# Patient Record
Sex: FEMALE | Race: BLACK OR AFRICAN AMERICAN | Employment: OTHER | URBAN - METROPOLITAN AREA
[De-identification: names, ages, dates, MRNs, and addresses within clinical notes are randomized per-mention and may not be internally consistent; named-entity substitution may affect disease eponyms.]

---

## 2017-09-07 ENCOUNTER — HOSPITAL ENCOUNTER (EMERGENCY)
Age: 77
Discharge: LEFT BEFORE DISCHARGE | End: 2017-09-07
Attending: EMERGENCY MEDICINE
Payer: MEDICARE

## 2017-09-07 ENCOUNTER — APPOINTMENT (OUTPATIENT)
Dept: GENERAL RADIOLOGY | Age: 77
End: 2017-09-07
Attending: EMERGENCY MEDICINE
Payer: MEDICARE

## 2017-09-07 VITALS
HEART RATE: 77 BPM | WEIGHT: 110 LBS | SYSTOLIC BLOOD PRESSURE: 178 MMHG | DIASTOLIC BLOOD PRESSURE: 88 MMHG | BODY MASS INDEX: 19.8 KG/M2 | TEMPERATURE: 97.4 F | OXYGEN SATURATION: 98 % | RESPIRATION RATE: 16 BRPM

## 2017-09-07 DIAGNOSIS — R07.9 CHEST PAIN, UNSPECIFIED TYPE: Primary | ICD-10-CM

## 2017-09-07 PROCEDURE — 71010 XR CHEST SNGL V: CPT

## 2017-09-07 PROCEDURE — 99284 EMERGENCY DEPT VISIT MOD MDM: CPT

## 2017-09-07 RX ORDER — GUAIFENESIN 100 MG/5ML
162 LIQUID (ML) ORAL
Status: DISCONTINUED | OUTPATIENT
Start: 2017-09-07 | End: 2017-09-07 | Stop reason: HOSPADM

## 2017-09-07 RX ORDER — ACETAMINOPHEN 500 MG
1000 TABLET ORAL ONCE
Status: DISCONTINUED | OUTPATIENT
Start: 2017-09-07 | End: 2017-09-07 | Stop reason: HOSPADM

## 2017-09-07 NOTE — ED TRIAGE NOTES
Pt family noticed patient had not changed her clothes in one week. Family wanted patient to change her clothes. Pt refused, and states that she \"is going to the hospital.\" Family states she does this when she doesn't want to do what is requested of her. 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

## 2017-09-07 NOTE — DISCHARGE INSTRUCTIONS
AS WE DISCUSSED, WE BELIEVE YOU SHOULD HAVE MORE TESTS DONE IN THE ER. IF YOU HAVE ANY WORSENING SYMPTOMS, OR IF YOU CHANGES YOUR MIND AND WOULD LIKE TO HAVE THE TESTS DONE, THEN RETURN TO THE ER RIGHT AWAY. OTHERWISE, PLEASE SEE YOUR PRIMARY CARE DOCTOR AS SOON AS POSSIBLE. Chest Pain: Care Instructions  Your Care Instructions  There are many things that can cause chest pain. Some are not serious and will get better on their own in a few days. But some kinds of chest pain need more testing and treatment. Your doctor may have recommended a follow-up visit in the next 8 to 12 hours. If you are not getting better, you may need more tests or treatment. Even though your doctor has released you, you still need to watch for any problems. The doctor carefully checked you, but sometimes problems can develop later. If you have new symptoms or if your symptoms do not get better, get medical care right away. If you have worse or different chest pain or pressure that lasts more than 5 minutes or you passed out (lost consciousness), call 911 or seek other emergency help right away. A medical visit is only one step in your treatment. Even if you feel better, you still need to do what your doctor recommends, such as going to all suggested follow-up appointments and taking medicines exactly as directed. This will help you recover and help prevent future problems. How can you care for yourself at home? · Rest until you feel better. · Take your medicine exactly as prescribed. Call your doctor if you think you are having a problem with your medicine. · Do not drive after taking a prescription pain medicine. When should you call for help? Call 911 if:  · You passed out (lost consciousness). · You have severe difficulty breathing. · You have symptoms of a heart attack. These may include:  ¨ Chest pain or pressure, or a strange feeling in your chest.  ¨ Sweating. ¨ Shortness of breath. ¨ Nausea or vomiting.   ¨ Pain, pressure, or a strange feeling in your back, neck, jaw, or upper belly or in one or both shoulders or arms. ¨ Lightheadedness or sudden weakness. ¨ A fast or irregular heartbeat. After you call 911, the  may tell you to chew 1 adult-strength or 2 to 4 low-dose aspirin. Wait for an ambulance. Do not try to drive yourself. Call your doctor today if:  · You have any trouble breathing. · Your chest pain gets worse. · You are dizzy or lightheaded, or you feel like you may faint. · You are not getting better as expected. · You are having new or different chest pain. Where can you learn more? Go to http://tomasz-joycelyn.info/. Enter A120 in the search box to learn more about \"Chest Pain: Care Instructions. \"  Current as of: March 20, 2017  Content Version: 11.3  © 0170-5012 [employer] Care instructions adapted under license by [employer] (which disclaims liability or warranty for this information). If you have questions about a medical condition or this instruction, always ask your healthcare professional. [place] [address] any warranty or liability for your use of this information.

## 2017-09-07 NOTE — ED NOTES
Dr. Erin Sung at bedside. The patient continues to refuse all treatments and is requesting to go home. AMA form completed. The patient's caregiver at bedside.

## 2017-09-07 NOTE — ED PROVIDER NOTES
HPI Comments: 3:21 PM Barbi Orozco is a 78 y.o. Female with a h/o CVA and HTN presenting to the ED via EMS with complaints of chest pain that occurred this morning when she woke up. Notes similar sx in the past, unsure of cause. The patient denies SOB or fever. No other complaints at this time. PCP: Benny Gamble NP      The history is provided by the patient. Past Medical History:   Diagnosis Date    Hypertension     Stroke [facility])        No past surgical history on file. Family History:   Problem Relation Age of Onset    Diabetes Neg Hx     Heart Disease Neg Hx        Social History     Social History    Marital status:      Spouse name: N/A    Number of children: N/A    Years of education: N/A     Occupational History    Not on file. Social History Main Topics    Smoking status: Never Smoker    Smokeless tobacco: Not on file    Alcohol use No    Drug use: Not on file    Sexual activity: Not on file     Other Topics Concern    Not on file     Social History Narrative         ALLERGIES: Review of patient's allergies indicates no known allergies. Review of Systems   Constitutional: Negative for chills, fatigue and fever. HENT: Negative for congestion, ear pain and sore throat. Eyes: Negative for pain, redness and itching. Respiratory: Negative for chest tightness, shortness of breath and wheezing. Cardiovascular: Positive for chest pain. Negative for palpitations and leg swelling. Gastrointestinal: Negative for abdominal pain, diarrhea, nausea and vomiting. Genitourinary: Negative for dysuria, flank pain, hematuria and pelvic pain. Musculoskeletal: Negative for arthralgias, back pain, joint swelling and myalgias. Skin: Negative for color change, pallor and rash. Neurological: Negative for dizziness, weakness and headaches. Hematological: Negative for adenopathy. Does not bruise/bleed easily.        Vitals:    09/07/17 1501   BP: 178/88 Pulse: 77   Resp: 16   Temp: 97.4 °F (36.3 °C)   SpO2: 98%   Weight: 49.9 kg (110 lb)            Physical Exam   Constitutional: No distress. HENT:   Head: Normocephalic and atraumatic. Mouth/Throat: Oropharynx is clear and moist.   Eyes: Conjunctivae and EOM are normal. Pupils are equal, round, and reactive to light. Neck: Normal range of motion. Neck supple. Cardiovascular: Normal rate, regular rhythm and normal heart sounds. No murmur heard. Pulmonary/Chest: Effort normal and breath sounds normal. She has no wheezes. She has no rales. Abdominal: Soft. Bowel sounds are normal. She exhibits no distension. There is no tenderness. Musculoskeletal: Normal range of motion. She exhibits no edema or deformity. Lymphadenopathy:     She has no cervical adenopathy. Neurological: She is alert. Coordination and gait normal.   Right sided hemiplegia, baseline per patient   Skin: Skin is warm and dry. No rash noted. She is not diaphoretic. No erythema. Psychiatric: She has a normal mood and affect. Her behavior is normal.        MDM  Number of Diagnoses or Management Options    ED Course       Procedures             Vitals:  Patient Vitals for the past 12 hrs:   Temp Pulse Resp BP SpO2   09/07/17 1501 97.4 °F (36.3 °C) 77 16 178/88 98 %       Medications Ordered:  Medications   aspirin chewable tablet 162 mg (not administered)   acetaminophen (TYLENOL) tablet 1,000 mg (not administered)       Lab Findings:  No results found for this or any previous visit (from the past 12 hour(s)). X-ray, CT or radiology findings or impressions:  XR CHEST SNGL V    (Results Pending)       Progress notes, consult notes, or additional procedure notes:    3:48 PM  Patient refusing to have EKG or any further testing. States she feels fine and wants to go home. She is able to articulate to me that risks of incomplete chest pain work up include heart attack, worsening pain or condition, permanent disability, or death. Caretaker at bedside, states her behavior is at baseline. Patient encouraged to f/u with primary care. Instructed that if she feels worse in any way or changes her mind then she should return to the ER immediately. 4:05 PM  Discussed with patient's condition with her son Buck Vizcarra who is reportedly medical POA. States that his mom only came in because she was upset with the caretaker wanting her to get dressed and go outside, so she called 911. He is okay with plan for releasing patient with option to f/u with primary care or return to ER. Diagnosis:   1. Chest pain, unspecified type        Disposition: Discharge AMA    Follow-up Information     None           Patient's Medications   Start Taking    No medications on file   Continue Taking    AMLODIPINE (NORVASC) 10 MG TABLET    Take 10 mg by mouth every evening. Indications: HYPERTENSION    ATORVASTATIN (LIPITOR) 40 MG TABLET    Take 1 Tab by mouth nightly. CALCIUM CARBONATE (CALTREX) 600 MG (1,500 MG) TABLET    Take 600 mg by mouth two (2) times a day. HYDRALAZINE (APRESOLINE) 25 MG TABLET    1 tab by mouth in morning, 2 tabs by mouth before bedtime  Indications: HYPERTENSION    LISINOPRIL (PRINIVIL, ZESTRIL) 10 MG TABLET    Take 40 mg by mouth daily. These Medications have changed    No medications on file   Stop Taking    No medications on file       Gabe Soto acting as a scribe for and in the presence of Krystal Guy MD      September 07, 2017 at [facility] PM       Provider Attestation:      I personally performed the services described in the documentation, reviewed the documentation, as recorded by the scribe in my presence, and it accurately and completely records my words and actions.  September 07, 2017 at 3:24 PM - Krystal Guy MD

## 2018-06-06 ENCOUNTER — HOSPITAL ENCOUNTER (EMERGENCY)
Age: 78
Discharge: HOME | End: 2018-06-06
Attending: EMERGENCY MEDICINE | Admitting: EMERGENCY MEDICINE
Payer: MEDICARE

## 2018-06-06 VITALS
RESPIRATION RATE: 21 BRPM | DIASTOLIC BLOOD PRESSURE: 106 MMHG | HEART RATE: 67 BPM | OXYGEN SATURATION: 100 % | TEMPERATURE: 98 F | SYSTOLIC BLOOD PRESSURE: 168 MMHG | WEIGHT: 103 LBS | BODY MASS INDEX: 18.54 KG/M2

## 2018-06-06 DIAGNOSIS — R42 DIZZINESS: Primary | ICD-10-CM

## 2018-06-06 LAB
ANION GAP SERPL CALC-SCNC: 5 MMOL/L (ref 3–18)
BASOPHILS # BLD: 0 K/UL (ref 0–0.06)
BASOPHILS NFR BLD: 0 % (ref 0–3)
BUN SERPL-MCNC: 10 MG/DL (ref 7–18)
BUN/CREAT SERPL: 14 (ref 12–20)
CALCIUM SERPL-MCNC: 8.8 MG/DL (ref 8.5–10.1)
CHLORIDE SERPL-SCNC: 105 MMOL/L (ref 100–108)
CO2 SERPL-SCNC: 33 MMOL/L (ref 21–32)
CREAT SERPL-MCNC: 0.72 MG/DL (ref 0.6–1.3)
DIFFERENTIAL METHOD BLD: ABNORMAL
EOSINOPHIL # BLD: 0.1 K/UL (ref 0–0.4)
EOSINOPHIL NFR BLD: 1 % (ref 0–5)
ERYTHROCYTE [DISTWIDTH] IN BLOOD BY AUTOMATED COUNT: 15.1 % (ref 11.6–14.5)
GLUCOSE SERPL-MCNC: 83 MG/DL (ref 74–99)
HCT VFR BLD AUTO: 43.7 % (ref 35–45)
HGB BLD-MCNC: 14.1 G/DL (ref 12–16)
LYMPHOCYTES # BLD: 2 K/UL (ref 0.8–3.5)
LYMPHOCYTES NFR BLD: 35 % (ref 20–51)
MAGNESIUM SERPL-MCNC: 2.3 MG/DL (ref 1.6–2.6)
MCH RBC QN AUTO: 25.3 PG (ref 24–34)
MCHC RBC AUTO-ENTMCNC: 32.3 G/DL (ref 31–37)
MCV RBC AUTO: 78.5 FL (ref 74–97)
MONOCYTES # BLD: 0.6 K/UL (ref 0–1)
MONOCYTES NFR BLD: 11 % (ref 2–9)
NEUTS SEG # BLD: 2.9 K/UL (ref 1.8–8)
NEUTS SEG NFR BLD: 53 % (ref 42–75)
PLATELET # BLD AUTO: 155 K/UL (ref 135–420)
PLATELET COMMENTS,PCOM: ABNORMAL
POTASSIUM SERPL-SCNC: 3.4 MMOL/L (ref 3.5–5.5)
RBC # BLD AUTO: 5.57 M/UL (ref 4.2–5.3)
RBC MORPH BLD: ABNORMAL
SODIUM SERPL-SCNC: 143 MMOL/L (ref 136–145)
WBC # BLD AUTO: 5.6 K/UL (ref 4.6–13.2)

## 2018-06-06 PROCEDURE — 83735 ASSAY OF MAGNESIUM: CPT | Performed by: EMERGENCY MEDICINE

## 2018-06-06 PROCEDURE — 99285 EMERGENCY DEPT VISIT HI MDM: CPT

## 2018-06-06 PROCEDURE — 93005 ELECTROCARDIOGRAM TRACING: CPT

## 2018-06-06 PROCEDURE — 85025 COMPLETE CBC W/AUTO DIFF WBC: CPT | Performed by: EMERGENCY MEDICINE

## 2018-06-06 PROCEDURE — 80048 BASIC METABOLIC PNL TOTAL CA: CPT | Performed by: EMERGENCY MEDICINE

## 2018-06-06 NOTE — ED TRIAGE NOTES
Episode of dizzyness today, resolved on arrival. Hx stroke, amb with cane. Right deficit.  Limited verbal

## 2018-06-06 NOTE — ED PROVIDER NOTES
EMERGENCY DEPARTMENT HISTORY AND PHYSICAL EXAM    7:35 PM      Date: 6/6/2018  Patient Name: Dolores Wheatley    History of Presenting Illness     Chief Complaint   Patient presents with    Dizziness         History Provided By: Patient, Patient's son    Chief Complaint: Dizziness  Duration:  Minutes  Timing:  Not currently present  Location:   Quality: N/A  Severity: N/A  Modifying Factors: None  Associated Symptoms: elevated blood pressure      Additional History (Context): Dolores Wheatley is a 70 y.o. female with a hx of HTN and stroke who presents for evaluation of dizziness that was reported by the [address] staff PTA. The nursing home also reported systolic blood pressure of over 200. The pt reports that she was never dizzy and does not know why she was sent here. She has no complaints. 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 She is at baseline per son. She has not been on BP medications in over a year and a half, she is followed by [facility]. She denies chest pain, SOB, abdominal pain, shoulder pain, and any further complaints. PCP: Cinthia Weiss NP    Current Outpatient Prescriptions   Medication Sig Dispense Refill    atorvastatin (LIPITOR) 40 mg tablet Take 1 Tab by mouth nightly. 30 Tab 0    hydrALAZINE (APRESOLINE) 25 mg tablet 1 tab by mouth in morning, 2 tabs by mouth before bedtime  Indications: HYPERTENSION 90 Tab 0    calcium carbonate (CALTREX) 600 mg (1,500 mg) tablet Take 600 mg by mouth two (2) times a day.  amLODIPine (NORVASC) 10 mg tablet Take 10 mg by mouth every evening. Indications: HYPERTENSION      lisinopril (PRINIVIL, ZESTRIL) 10 mg tablet Take 40 mg by mouth daily. Past History     Past Medical History:  Past Medical History:   Diagnosis Date    Hypertension     Stroke [facility])        Past Surgical History:  No past surgical history on file.     Family History:  Family History   Problem Relation Age of Onset    Diabetes Neg Hx     Heart Disease Neg Hx        Social History:  Social History   Substance Use Topics    Smoking status: Never Smoker    Smokeless tobacco: Not on file    Alcohol use No       Allergies:  No Known Allergies      Review of Systems     Review of Systems   Constitutional: Negative. Negative for chills and fever. HENT: Negative. Negative for congestion. Eyes: Negative. Negative for visual disturbance. Respiratory: Negative. Negative for cough and shortness of breath. Cardiovascular: Negative. Negative for chest pain and leg swelling. Gastrointestinal: Negative. Negative for abdominal pain, diarrhea and vomiting. Genitourinary: Negative. Negative for difficulty urinating, dysuria and vaginal discharge. Musculoskeletal: Negative. Negative for back pain and myalgias. Skin: Negative. Negative for rash and wound. Neurological: Positive for dizziness. Negative for weakness and light-headedness. Psychiatric/Behavioral: Negative. Negative for suicidal ideas. All other systems reviewed and are negative. Physical Exam     Visit Vitals    /87    Pulse 80    Temp 98 °F (36.7 °C)    Resp 16    Wt 46.7 kg (103 lb)    SpO2 98%    BMI 18.54 kg/m2       Physical Exam   Constitutional: She is oriented to person, place, and time. She appears well-developed and well-nourished. No distress. HENT:   Head: Normocephalic and atraumatic. Mouth/Throat: Oropharynx is clear and moist.   Eyes: Conjunctivae and EOM are normal. Pupils are equal, round, and reactive to light. No scleral icterus. Neck: Trachea normal and normal range of motion. Neck supple. No JVD present. No thyromegaly present. Cardiovascular: Normal rate, regular rhythm, S1 normal and S2 normal.  Exam reveals no gallop and no friction rub. No murmur heard.   Pulmonary/Chest: Effort normal and breath sounds normal. No accessory muscle usage. No respiratory distress. Abdominal: Soft. Normal appearance. She exhibits no distension. There is no tenderness. There is no rigidity, no rebound and no guarding. Musculoskeletal: Normal range of motion. She exhibits no edema or tenderness. Neurological: She is alert and oriented to person, place, and time. No sensory deficit. Coordination normal.   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 No rash noted. Psychiatric: She has a normal mood and affect. Her speech is normal and behavior is normal.   Vitals reviewed. Diagnostic Study Results     Labs -  Recent Results (from the past 12 hour(s))   EKG, 12 LEAD, INITIAL    Collection Time: 06/06/18  6:35 PM   Result Value Ref Range    Ventricular Rate 72 BPM    Atrial Rate 72 BPM    P-R Interval 114 ms    QRS Duration 76 ms    Q-T Interval 408 ms    QTC Calculation (Bezet) 446 ms    Calculated P Axis 49 degrees    Calculated R Axis 37 degrees    Calculated T Axis 75 degrees    Diagnosis       Normal sinus rhythm  Normal ECG  When compared with ECG of 22-DEC-2016 22:31,  T wave inversion no longer evident in Lateral leads     CBC WITH AUTOMATED DIFF    Collection Time: 06/06/18  7:00 PM   Result Value Ref Range    WBC 5.6 4.6 - 13.2 K/uL    RBC 5.57 (H) 4.20 - 5.30 M/uL    HGB 14.1 12.0 - 16.0 g/dL    HCT 43.7 35.0 - 45.0 %    MCV 78.5 74.0 - 97.0 FL    MCH 25.3 24.0 - 34.0 PG    MCHC 32.3 31.0 - 37.0 g/dL    RDW 15.1 (H) 11.6 - 14.5 %    PLATELET 230 523 - 164 K/uL    NEUTROPHILS 53 42 - 75 %    LYMPHOCYTES 35 20 - 51 %    MONOCYTES 11 (H) 2 - 9 %    EOSINOPHILS 1 0 - 5 %    BASOPHILS 0 0 - 3 %    ABS. NEUTROPHILS 2.9 1.8 - 8.0 K/UL    ABS. LYMPHOCYTES 2.0 0.8 - 3.5 K/UL    ABS. MONOCYTES 0.6 0 - 1.0 K/UL    ABS. EOSINOPHILS 0.1 0.0 - 0.4 K/UL    ABS.  BASOPHILS 0.0 0.0 - 0.06 K/UL    DF MANUAL      PLATELET COMMENTS ADEQUATE PLATELETS      RBC COMMENTS ANISOCYTOSIS  1+           Radiologic Studies -   No orders to display         Medical Decision Making   I am the first provider for this patient. I reviewed the vital signs, available nursing notes, past medical history, past surgical history, family history and social history. Vital Signs-Reviewed the patient's vital signs. Pulse Oximetry Analysis -  98% on room air (Interpretation)WNL    Cardiac Monitor:  Rate: 80 BPM  Rhythm:  Normal Sinus Rhythm     EKG: Interpreted by the EP. Time Interpreted: 1835   Rate: 72 BPM   Rhythm: Normal Sinus Rhythm    Interpretation:No evidence of WPW, HOCM, Brugada, prolonged QTc, or acute ischemia on ekg. Comparison:     Records Reviewed: Nursing Notes and Old Medical Records (Time of Review: 7:35 PM)    ED Course: Progress Notes, Reevaluation, and Consults:  Consult:  Discussed care with Dr. Kayode John, On-call physician for [facility]  Standard discussion; including history of patients chief complaint, available diagnostic results, and treatment course. He agrees with basic screening labs and if the pt is stable and has no signs of stroke it is perfectly reasonable to go back to her facility and they will contact her tomorrow for close outpatient follow up.  8:09 PM, 06/06/18     9:09 PM Pt reevaluated. Discussed results and plan for discharge with PCP follow up with pt and son. Pt still feels good and still appears well. Discussed return precautions for worsening sx. Provider Notes (Medical Decision Making): Ana Bartholomew is a 70 y.o. female coming in with reported dizziness by [facility] staff. Patient is completely A+Ox4 and denies these complaints at any time. She states that she feels fine and would like to be discharged home. Only focal deficits are old and no evidence of acute stroke or other organic pathologic process. Discussed with PCP and they are in agreement with plan for discharge and will follow up with patient in the next 1-2 days. Diagnosis     Clinical Impression:   1. Dizziness        Disposition: Discharge    Follow-up Information     Follow up With Details Comments [address], NP Call in 1 day  [address] [id number] [phone]      [facility] EMERGENCY DEPT  As needed, If symptoms worsen [address]  [phone]           Patient's Medications   Start Taking    No medications on file   Continue Taking    AMLODIPINE (NORVASC) 10 MG TABLET    Take 10 mg by mouth every evening. Indications: HYPERTENSION    ATORVASTATIN (LIPITOR) 40 MG TABLET    Take 1 Tab by mouth nightly. CALCIUM CARBONATE (CALTREX) 600 MG (1,500 MG) TABLET    Take 600 mg by mouth two (2) times a day. HYDRALAZINE (APRESOLINE) 25 MG TABLET    1 tab by mouth in morning, 2 tabs by mouth before bedtime  Indications: HYPERTENSION    LISINOPRIL (PRINIVIL, ZESTRIL) 10 MG TABLET    Take 40 mg by mouth daily. These Medications have changed    No medications on file   Stop Taking    No medications on file     _______________________________    Attestations:  [facility] Sukhdeep acting as a scribe for and in the presence of Salena Block MD      June 06, 2018 at 9:12 PM       Provider Attestation:      I personally performed the services described in the documentation, reviewed the documentation, as recorded by the scribe in my presence, and it accurately and completely records my words and actions.  June 06, 2018 at 9:12 PM - Salena Block MD    _______________________________

## 2018-06-06 NOTE — ED NOTES
Assumed care of pt. Pt stating \"I don't know why I'm here. I feel fine. \" per EMS pt experienced a syncopal edisode while ambulating at [facility]. Pt A&Ox3, denies any other complaints. Will continue to monitor.

## 2018-06-07 LAB
ATRIAL RATE: 72 BPM
CALCULATED P AXIS, ECG09: 49 DEGREES
CALCULATED R AXIS, ECG10: 37 DEGREES
CALCULATED T AXIS, ECG11: 75 DEGREES
DIAGNOSIS, 93000: NORMAL
P-R INTERVAL, ECG05: 114 MS
Q-T INTERVAL, ECG07: 408 MS
QRS DURATION, ECG06: 76 MS
QTC CALCULATION (BEZET), ECG08: 446 MS
VENTRICULAR RATE, ECG03: 72 BPM

## 2018-06-07 NOTE — ED NOTES
Pt up to bathroom and back to bed. Discharge and f/u instructions given to pt and son. Verbalized understanding. Pt refused wheelchair. Ambulated out of ED with son.

## 2018-06-07 NOTE — DISCHARGE INSTRUCTIONS
Dizziness: Care Instructions  Your Care Instructions  Dizziness is the feeling of unsteadiness or fuzziness in your head. It is different than having vertigo, which is a feeling that the room is spinning or that you are moving or falling. It is also different from lightheadedness, which is the feeling that you are about to faint. It can be hard to know what causes dizziness. Some people feel dizzy when they have migraine headaches. Sometimes bouts of flu can make you feel dizzy. Some medical conditions, such as heart problems or high blood pressure, can make you feel dizzy. Many medicines can cause dizziness, including medicines for high blood pressure, pain, or anxiety. If a medicine causes your symptoms, your doctor may recommend that you stop or change the medicine. If it is a problem with your heart, you may need medicine to help your heart work better. If there is no clear reason for your symptoms, your doctor may suggest watching and waiting for a while to see if the dizziness goes away on its own. Follow-up care is a key part of your treatment and safety. Be sure to make and go to all appointments, and call your doctor if you are having problems. It's also a good idea to know your test results and keep a list of the medicines you take. How can you care for yourself at home? · If your doctor recommends or prescribes medicine, take it exactly as directed. Call your doctor if you think you are having a problem with your medicine. · Do not drive while you feel dizzy. · Try to prevent falls. Steps you can take include:  ¨ Using nonskid mats, adding grab bars near the tub, and using night-lights. ¨ Clearing your home so that walkways are free of anything you might trip on. ¨ Letting family and friends know that you have been feeling dizzy. This will help them know how to help you. When should you call for help? Call 911 anytime you think you may need emergency care.  For example, call if:  ? · You passed out (lost consciousness). ? · You have dizziness along with symptoms of a heart attack. These may include:  ¨ Chest pain or pressure, or a strange feeling in the chest.  ¨ Sweating. ¨ Shortness of breath. ¨ Nausea or vomiting. ¨ Pain, pressure, or a strange feeling in the back, neck, jaw, or upper belly or in one or both shoulders or arms. ¨ Lightheadedness or sudden weakness. ¨ A fast or irregular heartbeat. ? · You have symptoms of a stroke. These may include:  ¨ Sudden numbness, tingling, weakness, or loss of movement in your face, arm, or leg, especially on only one side of your body. ¨ Sudden vision changes. ¨ Sudden trouble speaking. ¨ Sudden confusion or trouble understanding simple statements. ¨ Sudden problems with walking or balance. ¨ A sudden, severe headache that is different from past headaches. ?Call your doctor now or seek immediate medical care if:  ? · You feel dizzy and have a fever, headache, or ringing in your ears. ? · You have new or increased nausea and vomiting. ? · Your dizziness does not go away or comes back. ? Watch closely for changes in your health, and be sure to contact your doctor if:  ? · You do not get better as expected. Where can you learn more? Go to http://tomasz-joycelyn.info/. Enter K602 in the search box to learn more about \"Dizziness: Care Instructions. \"  Current as of: March 20, 2017  Content Version: 11.4  © 7653-6971 [employer]. Care instructions adapted under license by [employer] (which disclaims liability or warranty for this information). If you have questions about a medical condition or this instruction, always ask your healthcare professional. [place] [address] any warranty or liability for your use of this information.

## 2018-07-13 ENCOUNTER — HOSPITAL ENCOUNTER (INPATIENT)
Age: 78
LOS: 3 days | Discharge: HOME HEALTH | DRG: 069 | End: 2018-07-16
Attending: EMERGENCY MEDICINE | Admitting: INTERNAL MEDICINE
Payer: MEDICARE

## 2018-07-13 ENCOUNTER — APPOINTMENT (OUTPATIENT)
Dept: MRI IMAGING | Age: 78
DRG: 069 | End: 2018-07-13
Attending: INTERNAL MEDICINE
Payer: MEDICARE

## 2018-07-13 ENCOUNTER — APPOINTMENT (OUTPATIENT)
Dept: GENERAL RADIOLOGY | Age: 78
DRG: 069 | End: 2018-07-13
Attending: INTERNAL MEDICINE
Payer: MEDICARE

## 2018-07-13 ENCOUNTER — APPOINTMENT (OUTPATIENT)
Dept: CT IMAGING | Age: 78
DRG: 069 | End: 2018-07-13
Attending: STUDENT IN AN ORGANIZED HEALTH CARE EDUCATION/TRAINING PROGRAM
Payer: MEDICARE

## 2018-07-13 ENCOUNTER — APPOINTMENT (OUTPATIENT)
Dept: GENERAL RADIOLOGY | Age: 78
DRG: 069 | End: 2018-07-13
Attending: STUDENT IN AN ORGANIZED HEALTH CARE EDUCATION/TRAINING PROGRAM
Payer: MEDICARE

## 2018-07-13 DIAGNOSIS — G45.9 TRANSIENT CEREBRAL ISCHEMIA, UNSPECIFIED TYPE: Primary | ICD-10-CM

## 2018-07-13 DIAGNOSIS — I15.9 SECONDARY HYPERTENSION: Chronic | ICD-10-CM

## 2018-07-13 LAB
ANION GAP SERPL CALC-SCNC: 9 MMOL/L (ref 3–18)
ATRIAL RATE: 69 BPM
BASOPHILS # BLD: 0 K/UL (ref 0–0.1)
BASOPHILS NFR BLD: 0 % (ref 0–2)
BUN SERPL-MCNC: 14 MG/DL (ref 7–18)
BUN/CREAT SERPL: 19 (ref 12–20)
CALCIUM SERPL-MCNC: 9.4 MG/DL (ref 8.5–10.1)
CALCULATED P AXIS, ECG09: 74 DEGREES
CALCULATED R AXIS, ECG10: 15 DEGREES
CALCULATED T AXIS, ECG11: 71 DEGREES
CHLORIDE SERPL-SCNC: 105 MMOL/L (ref 100–108)
CO2 SERPL-SCNC: 28 MMOL/L (ref 21–32)
CREAT SERPL-MCNC: 0.73 MG/DL (ref 0.6–1.3)
DIAGNOSIS, 93000: NORMAL
DIFFERENTIAL METHOD BLD: ABNORMAL
EOSINOPHIL # BLD: 0.2 K/UL (ref 0–0.4)
EOSINOPHIL NFR BLD: 3 % (ref 0–5)
ERYTHROCYTE [DISTWIDTH] IN BLOOD BY AUTOMATED COUNT: 15.2 % (ref 11.6–14.5)
GLUCOSE BLD STRIP.AUTO-MCNC: 66 MG/DL (ref 70–110)
GLUCOSE BLD STRIP.AUTO-MCNC: 89 MG/DL (ref 70–110)
GLUCOSE BLD STRIP.AUTO-MCNC: 99 MG/DL (ref 70–110)
GLUCOSE SERPL-MCNC: 82 MG/DL (ref 74–99)
HCT VFR BLD AUTO: 38.7 % (ref 35–45)
HGB BLD-MCNC: 12.4 G/DL (ref 12–16)
INR PPP: 1 (ref 0.8–1.2)
LYMPHOCYTES # BLD: 2.4 K/UL (ref 0.9–3.6)
LYMPHOCYTES NFR BLD: 41 % (ref 21–52)
MCH RBC QN AUTO: 25.2 PG (ref 24–34)
MCHC RBC AUTO-ENTMCNC: 32 G/DL (ref 31–37)
MCV RBC AUTO: 78.5 FL (ref 74–97)
MONOCYTES # BLD: 0.7 K/UL (ref 0.05–1.2)
MONOCYTES NFR BLD: 12 % (ref 3–10)
NEUTS SEG # BLD: 2.6 K/UL (ref 1.8–8)
NEUTS SEG NFR BLD: 44 % (ref 40–73)
P-R INTERVAL, ECG05: 152 MS
PLATELET # BLD AUTO: 173 K/UL (ref 135–420)
PMV BLD AUTO: 11.6 FL (ref 9.2–11.8)
POTASSIUM SERPL-SCNC: 4.6 MMOL/L (ref 3.5–5.5)
PROTHROMBIN TIME: 12.4 SEC (ref 11.5–15.2)
Q-T INTERVAL, ECG07: 432 MS
QRS DURATION, ECG06: 90 MS
QTC CALCULATION (BEZET), ECG08: 462 MS
RBC # BLD AUTO: 4.93 M/UL (ref 4.2–5.3)
SODIUM SERPL-SCNC: 142 MMOL/L (ref 136–145)
VENTRICULAR RATE, ECG03: 69 BPM
WBC # BLD AUTO: 5.8 K/UL (ref 4.6–13.2)

## 2018-07-13 PROCEDURE — 80048 BASIC METABOLIC PNL TOTAL CA: CPT

## 2018-07-13 PROCEDURE — 96376 TX/PRO/DX INJ SAME DRUG ADON: CPT

## 2018-07-13 PROCEDURE — 82962 GLUCOSE BLOOD TEST: CPT

## 2018-07-13 PROCEDURE — 99285 EMERGENCY DEPT VISIT HI MDM: CPT

## 2018-07-13 PROCEDURE — 93005 ELECTROCARDIOGRAM TRACING: CPT

## 2018-07-13 PROCEDURE — 74011250636 HC RX REV CODE- 250/636: Performed by: STUDENT IN AN ORGANIZED HEALTH CARE EDUCATION/TRAINING PROGRAM

## 2018-07-13 PROCEDURE — 70030 X-RAY EYE FOR FOREIGN BODY: CPT

## 2018-07-13 PROCEDURE — 74011250636 HC RX REV CODE- 250/636: Performed by: INTERNAL MEDICINE

## 2018-07-13 PROCEDURE — A9577 INJ MULTIHANCE: HCPCS | Performed by: INTERNAL MEDICINE

## 2018-07-13 PROCEDURE — 74011250637 HC RX REV CODE- 250/637: Performed by: STUDENT IN AN ORGANIZED HEALTH CARE EDUCATION/TRAINING PROGRAM

## 2018-07-13 PROCEDURE — 74018 RADEX ABDOMEN 1 VIEW: CPT

## 2018-07-13 PROCEDURE — 85610 PROTHROMBIN TIME: CPT

## 2018-07-13 PROCEDURE — 70450 CT HEAD/BRAIN W/O DYE: CPT

## 2018-07-13 PROCEDURE — 74011250637 HC RX REV CODE- 250/637: Performed by: INTERNAL MEDICINE

## 2018-07-13 PROCEDURE — 65660000000 HC RM CCU STEPDOWN

## 2018-07-13 PROCEDURE — 77030021566 MRA NECK W WO CONT

## 2018-07-13 PROCEDURE — 85025 COMPLETE CBC W/AUTO DIFF WBC: CPT

## 2018-07-13 PROCEDURE — 70551 MRI BRAIN STEM W/O DYE: CPT

## 2018-07-13 PROCEDURE — 71045 X-RAY EXAM CHEST 1 VIEW: CPT

## 2018-07-13 PROCEDURE — 96374 THER/PROPH/DIAG INJ IV PUSH: CPT

## 2018-07-13 RX ORDER — HEPARIN SODIUM 5000 [USP'U]/ML
5000 INJECTION, SOLUTION INTRAVENOUS; SUBCUTANEOUS EVERY 8 HOURS
Status: DISCONTINUED | OUTPATIENT
Start: 2018-07-13 | End: 2018-07-16 | Stop reason: HOSPADM

## 2018-07-13 RX ORDER — AMLODIPINE BESYLATE 10 MG/1
10 TABLET ORAL EVERY EVENING
Status: DISCONTINUED | OUTPATIENT
Start: 2018-07-13 | End: 2018-07-15

## 2018-07-13 RX ORDER — ATORVASTATIN CALCIUM 40 MG/1
40 TABLET, FILM COATED ORAL
Status: DISCONTINUED | OUTPATIENT
Start: 2018-07-13 | End: 2018-07-16 | Stop reason: HOSPADM

## 2018-07-13 RX ORDER — CLOPIDOGREL BISULFATE 75 MG/1
75 TABLET ORAL DAILY
Status: DISCONTINUED | OUTPATIENT
Start: 2018-07-14 | End: 2018-07-16 | Stop reason: HOSPADM

## 2018-07-13 RX ORDER — SODIUM CHLORIDE 0.9 % (FLUSH) 0.9 %
5-10 SYRINGE (ML) INJECTION EVERY 8 HOURS
Status: DISCONTINUED | OUTPATIENT
Start: 2018-07-13 | End: 2018-07-16 | Stop reason: HOSPADM

## 2018-07-13 RX ORDER — GUAIFENESIN 100 MG/5ML
81 LIQUID (ML) ORAL DAILY
Status: DISCONTINUED | OUTPATIENT
Start: 2018-07-14 | End: 2018-07-16 | Stop reason: HOSPADM

## 2018-07-13 RX ORDER — LISINOPRIL 40 MG/1
40 TABLET ORAL DAILY
Status: DISCONTINUED | OUTPATIENT
Start: 2018-07-14 | End: 2018-07-15

## 2018-07-13 RX ORDER — HYDRALAZINE HYDROCHLORIDE 50 MG/1
50 TABLET, FILM COATED ORAL
Status: DISCONTINUED | OUTPATIENT
Start: 2018-07-13 | End: 2018-07-15

## 2018-07-13 RX ORDER — HYDRALAZINE HYDROCHLORIDE 25 MG/1
25 TABLET, FILM COATED ORAL SEE ADMIN INSTRUCTIONS
Status: DISCONTINUED | OUTPATIENT
Start: 2018-07-13 | End: 2018-07-13 | Stop reason: SDUPTHER

## 2018-07-13 RX ORDER — LABETALOL HCL 20 MG/4 ML
5 SYRINGE (ML) INTRAVENOUS
Status: COMPLETED | OUTPATIENT
Start: 2018-07-13 | End: 2018-07-13

## 2018-07-13 RX ORDER — LABETALOL HCL 20 MG/4 ML
10 SYRINGE (ML) INTRAVENOUS
Status: COMPLETED | OUTPATIENT
Start: 2018-07-13 | End: 2018-07-13

## 2018-07-13 RX ORDER — HYDRALAZINE HYDROCHLORIDE 25 MG/1
25 TABLET, FILM COATED ORAL DAILY
Status: DISCONTINUED | OUTPATIENT
Start: 2018-07-14 | End: 2018-07-15

## 2018-07-13 RX ORDER — ASPIRIN 325 MG
325 TABLET ORAL
Status: COMPLETED | OUTPATIENT
Start: 2018-07-13 | End: 2018-07-13

## 2018-07-13 RX ORDER — SODIUM CHLORIDE 0.9 % (FLUSH) 0.9 %
5-10 SYRINGE (ML) INJECTION AS NEEDED
Status: DISCONTINUED | OUTPATIENT
Start: 2018-07-13 | End: 2018-07-16 | Stop reason: HOSPADM

## 2018-07-13 RX ORDER — ACETAMINOPHEN 325 MG/1
650 TABLET ORAL
Status: DISCONTINUED | OUTPATIENT
Start: 2018-07-13 | End: 2018-07-16 | Stop reason: HOSPADM

## 2018-07-13 RX ADMIN — GADOBENATE DIMEGLUMINE 10 ML: 529 INJECTION, SOLUTION INTRAVENOUS at 19:53

## 2018-07-13 RX ADMIN — GADOBENATE DIMEGLUMINE 10 ML: 529 INJECTION, SOLUTION INTRAVENOUS at 19:54

## 2018-07-13 RX ADMIN — LABETALOL HYDROCHLORIDE 10 MG: 5 INJECTION, SOLUTION INTRAVENOUS at 14:42

## 2018-07-13 RX ADMIN — LABETALOL 20 MG/4 ML (5 MG/ML) INTRAVENOUS SYRINGE 5 MG: at 13:22

## 2018-07-13 RX ADMIN — HYDRALAZINE HYDROCHLORIDE 50 MG: 50 TABLET, FILM COATED ORAL at 23:04

## 2018-07-13 RX ADMIN — Medication 10 ML: at 23:04

## 2018-07-13 RX ADMIN — ASPIRIN 325 MG ORAL TABLET 325 MG: 325 PILL ORAL at 15:55

## 2018-07-13 RX ADMIN — Medication 5 ML: at 15:56

## 2018-07-13 RX ADMIN — LABETALOL HYDROCHLORIDE 5 MG: 5 INJECTION, SOLUTION INTRAVENOUS at 14:11

## 2018-07-13 NOTE — PROGRESS NOTES
Stroke Education provided to patient and the following topics were discussed    1. Patients personal risk factors for stroke are hypertension, hyperlipidemia and prior stroke    2. Warning signs of Stroke:        * Sudden numbness or weakness of the face, arm or leg, especially on one side of          The body            * Sudden confusion, trouble speaking or understanding        * Sudden trouble seeing in one or both eyes        * Sudden trouble walking, dizziness, loss of balance or coordination        * Sudden severe headache with no known cause      3. Importance of activation Emergency Medical Services ( 9-1-1 ) immediately if experience any warning signs of stroke. 4. Be sure and schedule a follow-up appointment with your primary care doctor or any specialists as instructed. 5. You must take medicine every day to treat your risk factors for stroke. Be sure to take your medicines exactly as your doctor tells you: no more, no less. Know what your medicines are for , what they do. Anti-thrombotics /anticoagulants can help prevent strokes. You are taking the following medicine(s)  Aspirin     6. Smoking and second-hand smoke greatly increase your risk of stroke, cardiovascular disease and death. Smoking history never    7. Information provided was BS Stroke Education Binder    8. Documentation of teaching completed in Patient Education Activity and on Care Plan with teaching response noted?   yes

## 2018-07-13 NOTE — Clinical Note
Status[de-identified] Inpatient [101] Type of Bed: Telemetry [19] Inpatient Hospitalization Certified Necessary for the Following Reasons: 1. Patient Failed outpatient treatment (further clarification in H&P documentation) Admitting Diagnosis: TIA (transient ischemic attack) [575973] Admitting Physician: Rhea Kline [[id number]] Attending Physician: Rhea Kline [[id number]] Estimated Length of Stay: 2 Midnights Discharge Plan[de-identified] Home with Office Follow-up

## 2018-07-13 NOTE — H&P
Hospitalist Admission Note NAME: Lianna Marroquin :  1940 MRN:  [id number] Date/Time of admission:  2018 2:56 PM 
 
Patient PCP: Melina Ackerman NP 
________________________________________________________________________ My assessment of this patient's clinical condition and my plan of care is as follows. Assessment / Plan: 1. Transient Ischemic Attack (TIA) 2. H/o CVA with known persistent bilateral ABRAHAM occlusions with multifocal areas of stenoses throughout the cerebral vascular system 3. Benign HTN 
4. Hyperlipidemia 1. Admit to neuro floor 2. Routine TIA evaluation: MRI brain, MRA head/neck, echo 3. Check lipids, a1c, TSH 
4. No need for permissive htn in setting of TIA 5. ASA, Statin, add plavix in setting of symptoms while on ASA 6. PT/OT 7. Likely dispo to snf Code Status: full Surrogate Decision Maker: tbd 
 
DVT Prophylaxis: sc hep GI Prophylaxis: not indicated Subjective: CHIEF COMPLAINT: unresponsive HISTORY OF PRESENT ILLNESS:    
Freda Venegas is a 70 y.o.  female who presented to the ED after being found \"unresponsive\" during lunch time at her facility. EMS was called and she was reported as being aphasic with right sided hemiparesis. Stroke Alert was called and pt went for CT head and further w/u was initiated. CT was wnl and pt's symptoms completely resolved during period of getting her CT. Per chart review, pt had a CVA which initiated her last admission in 2016 and a similar unresponsiveness was noted. We were asked to admit for work up and evaluation of the above problems. Past Medical History:  
Diagnosis Date  Hypertension  Stroke ([address] No past surgical history on file. Social History Substance Use Topics  Smoking status: Never Smoker  Smokeless tobacco: Not on file  Alcohol use No  
  
 
Family History Problem Relation Age of Onset  Diabetes Neg Hx   
 Heart Disease Neg Hx   
 
No Known Allergies Prior to Admission medications Medication Sig Start Date End Date Taking? Authorizing Provider  
atorvastatin (LIPITOR) 40 mg tablet Take 1 Tab by mouth nightly. 5/19/16   Villa Ramos MD  
hydrALAZINE (APRESOLINE) 25 mg tablet 1 tab by mouth in morning, 2 tabs by mouth before bedtime  Indications: HYPERTENSION 5/19/16   Mike Lambert MD  
calcium carbonate (CALTREX) 600 mg (1,500 mg) tablet Take 600 mg by mouth two (2) times a day. Historical Provider  
amLODIPine (NORVASC) 10 mg tablet Take 10 mg by mouth every evening. Indications: HYPERTENSION    Tere Mills MD  
lisinopril (PRINIVIL, ZESTRIL) 10 mg tablet Take 40 mg by mouth daily. Tere Mills MD  
 
 
REVIEW OF SYSTEMS:    
I am not able to complete the review of systems because: The patient is intubated and sedated The patient has altered mental status due to his acute medical problems The patient has baseline aphasia from prior stroke(s) The patient has baseline dementia and is not reliable historian The patient is in acute medical distress and unable to provide information Total of 12 systems reviewed as follows:   
   POSITIVE= bolded text  Negative = text not underlined General:  fever, chills, sweats, generalized weakness, weight loss/gain,  
   loss of appetite Eyes:    blurred vision, eye pain, loss of vision, double vision ENT:    rhinorrhea, pharyngitis Respiratory:   cough, sputum production, SOB, CHA, wheezing, pleuritic pain  
Cardiology:   chest pain, palpitations, orthopnea, PND, edema, syncope Gastrointestinal:  abdominal pain , N/V, diarrhea, dysphagia, constipation, bleeding Genitourinary:  frequency, urgency, dysuria, hematuria, incontinence Muskuloskeletal :  arthralgia, myalgia, back pain Hematology:  easy bruising, nose or gum bleeding, lymphadenopathy Dermatological: rash, ulceration, pruritis, color change / jaundice Endocrine:   hot flashes or polydipsia Neurological:  headache, dizziness, confusion, focal weakness, paresthesia, Speech difficulties, memory loss, gait difficulty, chronic parkinsons tremor Psychological: Feelings of anxiety, depression, agitation Objective: VITALS:   
Visit Vitals  /86  Pulse 74  Temp 98 °F (36.7 °C)  Resp 23  SpO2 96% PHYSICAL EXAM: 
 
General:    Alert, cooperative, no distress, appears stated age. HEENT: Atraumatic, anicteric sclerae, pink conjunctivae No oral ulcers, mucosa moist, throat clear, dentition fair Neck:  Supple, symmetrical,  thyroid: non tender Lungs:   Clear to auscultation bilaterally. No Wheezing or Rhonchi. No rales. Chest wall:  No tenderness  No Accessory muscle use. Heart:   Regular  rhythm,  No  murmur   No edema Abdomen:   Soft, non-tender. Not distended. Bowel sounds normal 
Extremities: No cyanosis. No clubbing,   
  Skin turgor normal, Capillary refill normal, Radial dial pulse 2+, arthritic hands Skin:     Not pale. Not Jaundiced  No rashes Psych:  Good insight. Not depressed. Not anxious or agitated. Neurologic: EOMs intact. No facial asymmetry. No aphasia or slurred speech. Symmetrical strength, Sensation grossly intact. Alert and oriented X 4, chronic pill rolling tremor. _______________________________________________________________________ Care Plan discussed with: 
  Comments Patient x Family RN Care Manager Consultant:     
_______________________________________________________________________ Expected  Disposition:  
Home with Family HH/PT/OT/RN x  
SNF/LTC   
[facility]   
________________________________________________________________________ TOTAL TIME:  50 Minutes Critical Care Provided     Minutes non procedure based Comments  
 x Reviewed previous records  
>50% of visit spent in counseling and coordination of care x Discussion with patient and/or family and questions answered ________________________________________________________________________ Procedures: see electronic medical records for all procedures/Xrays and details which were not copied into this note but were reviewed prior to creation of Plan. LAB DATA REVIEWED:   
Recent Results (from the past 24 hour(s)) GLUCOSE, POC Collection Time: 07/13/18  1:04 PM  
Result Value Ref Range Glucose (POC) 66 (L) 70 - 110 mg/dL METABOLIC PANEL, BASIC Collection Time: 07/13/18  1:20 PM  
Result Value Ref Range Sodium 142 136 - 145 mmol/L Potassium 4.6 3.5 - 5.5 mmol/L Chloride 105 100 - 108 mmol/L  
 CO2 28 21 - 32 mmol/L Anion gap 9 3.0 - 18 mmol/L Glucose 82 74 - 99 mg/dL BUN 14 7.0 - 18 MG/DL Creatinine 0.73 0.6 - 1.3 MG/DL  
 BUN/Creatinine ratio 19 12 - 20 GFR est AA >60 >60 ml/min/1.73m2 GFR est non-AA >60 >60 ml/min/1.73m2 Calcium 9.4 8.5 - 10.1 MG/DL  
CBC WITH AUTOMATED DIFF Collection Time: 07/13/18  1:20 PM  
Result Value Ref Range WBC 5.8 4.6 - 13.2 K/uL  
 RBC 4.93 4.20 - 5.30 M/uL  
 HGB 12.4 12.0 - 16.0 g/dL HCT 38.7 35.0 - 45.0 % MCV 78.5 74.0 - 97.0 FL  
 MCH 25.2 24.0 - 34.0 PG  
 MCHC 32.0 31.0 - 37.0 g/dL  
 RDW 15.2 (H) 11.6 - 14.5 % PLATELET 726 283 - 699 K/uL MPV 11.6 9.2 - 11.8 FL  
 NEUTROPHILS 44 40 - 73 % LYMPHOCYTES 41 21 - 52 % MONOCYTES 12 (H) 3 - 10 % EOSINOPHILS 3 0 - 5 % BASOPHILS 0 0 - 2 %  
 ABS. NEUTROPHILS 2.6 1.8 - 8.0 K/UL  
 ABS. LYMPHOCYTES 2.4 0.9 - 3.6 K/UL  
 ABS. MONOCYTES 0.7 0.05 - 1.2 K/UL  
 ABS. EOSINOPHILS 0.2 0.0 - 0.4 K/UL  
 ABS. BASOPHILS 0.0 0.0 - 0.1 K/UL  
 DF AUTOMATED PROTHROMBIN TIME + INR Collection Time: 07/13/18  1:20 PM  
Result Value Ref Range Prothrombin time 12.4 11.5 - 15.2 sec INR 1.0 0.8 - 1.2 EKG, 12 LEAD, INITIAL Collection Time: 07/13/18  2:07 PM  
Result Value Ref Range Ventricular Rate 69 BPM  
 Atrial Rate 69 BPM  
 P-R Interval 152 ms  QRS Duration 90 ms Q-T Interval 432 ms QTC Calculation (Bezet) 462 ms Calculated P Axis 74 degrees Calculated R Axis 15 degrees Calculated T Axis 71 degrees Diagnosis Normal sinus rhythm Possible Left atrial enlargement Borderline ECG When compared with ECG of 06-JUN-2018 18:35, No significant change was found Luis Miguel Reynoso MD 
Internal Medicine Hospitalist Division

## 2018-07-13 NOTE — ED TRIAGE NOTES
Pt arrived via EMS from [facility] after an episode of hypertension, pt has positive right sided facial droop which may be residual from previous stroke, pt reporting pain in R arm, all extremities move purposefully, a&ox4,

## 2018-07-13 NOTE — LETTER
[facility] 
[facility] EMERGENCY DEPT 
[address] [facility] [address] 
[phone] Work/School Note Date: 7/13/2018 To Whom It May concern: 
 
Allan Pang was seen and treated today in the emergency room by the following provider(s): 
Attending Provider: Luann Eddy MD 
Resident: Geovanny Tang MD. Allan Pang may return to work on Monday 7/16/18. Sincerely, Denilson Carter RN

## 2018-07-13 NOTE — ED PROVIDER NOTES
EMERGENCY DEPARTMENT HISTORY AND PHYSICAL EXAM    12:50 PM      Date: 7/13/2018  Patient Name: Zelalem Lopez    History of Presenting Illness     Chief Complaint   Patient presents with    Hypertension       History Provided By: EMS    Chief Complaint: Unresponsiveness  Duration:  Minutes - last seen well at 1200, 45 minutes prior to arrival   Timing:  Acute  Location: generalized  Quality: N/A  Severity: N/A  Modifying Factors: N/A  Associated Symptoms: denies any other associated signs or symptoms      Additional History (Context): Zelalem Lopez is a 70 y.o. female with hypertension, hyperlipidemia and stroke who presents with acute onset change in mental status 45 minutes prior to arrival per EMS who were called to her home for a change in mental status during lunch. On arrival to ED, patient nonresponsive to voice and unable to follow commands. Stroke scale 18. After CT, patient fully verbal stating \"I was just mad no one was listening to me, and I was scared. But there's nothing wrong, I feel fine. \" Patient with out any complaints at this time, lying in bed, able to respond to commands and follow directions appropriately. PCP: Shannan Opitz, NP    Current Facility-Administered Medications   Medication Dose Route Frequency Provider Last Rate Last Dose    labetalol (NORMODYNE;TRANDATE) 20 mg/4 mL (5 mg/mL) injection 10 mg  10 mg IntraVENous NOW Shena Rojas MD        aspirin (ASPIRIN) tablet 325 mg  325 mg Oral NOW Shena Rojas MD         Current Outpatient Prescriptions   Medication Sig Dispense Refill    atorvastatin (LIPITOR) 40 mg tablet Take 1 Tab by mouth nightly. 30 Tab 0    hydrALAZINE (APRESOLINE) 25 mg tablet 1 tab by mouth in morning, 2 tabs by mouth before bedtime  Indications: HYPERTENSION 90 Tab 0    calcium carbonate (CALTREX) 600 mg (1,500 mg) tablet Take 600 mg by mouth two (2) times a day.  amLODIPine (NORVASC) 10 mg tablet Take 10 mg by mouth every evening. Indications: HYPERTENSION      lisinopril (PRINIVIL, ZESTRIL) 10 mg tablet Take 40 mg by mouth daily. Past History     Past Medical History:  Past Medical History:   Diagnosis Date    Hypertension     Stroke [facility])        Past Surgical History:  No past surgical history on file. Family History:  Family History   Problem Relation Age of Onset    Diabetes Neg Hx     Heart Disease Neg Hx        Social History:  Social History   Substance Use Topics    Smoking status: Never Smoker    Smokeless tobacco: Not on file    Alcohol use No       Allergies:  No Known Allergies      Review of Systems     Review of Systems   Constitutional: Negative for diaphoresis and fever. HENT: Positive for dental problem. Negative for sore throat and tinnitus. Eyes: Negative for photophobia. Respiratory: Negative for cough, chest tightness and shortness of breath. Cardiovascular: Negative for chest pain. Gastrointestinal: Negative for abdominal distention, abdominal pain, constipation, diarrhea and nausea. Endocrine: Negative for cold intolerance and heat intolerance. Genitourinary: Negative for difficulty urinating, dysuria, flank pain, frequency and pelvic pain. Musculoskeletal: Negative for back pain and gait problem. Skin: Negative for color change and rash. Allergic/Immunologic: Negative for immunocompromised state. Neurological: Negative for dizziness, light-headedness and headaches. Hematological: Negative for adenopathy. Psychiatric/Behavioral: Negative for agitation. Physical Exam     Visit Vitals    BP (!) 174/143    Pulse 72    Temp 98 °F (36.7 °C)    Resp 19    SpO2 96%       Physical Exam   Constitutional: She is oriented to person, place, and time. No distress. HENT:   Head: Atraumatic. Mouth/Throat: Oropharynx is clear and moist. No oropharyngeal exudate. Eyes: Pupils are equal, round, and reactive to light. Right eye exhibits no discharge.  Left eye exhibits no discharge. Neck: Normal range of motion. No JVD present. No tracheal deviation present. Cardiovascular: Normal rate, regular rhythm, normal heart sounds and intact distal pulses. Exam reveals no gallop. No murmur heard. Pulmonary/Chest: Effort normal and breath sounds normal. No stridor. No respiratory distress. She has no wheezes. Abdominal: Soft. Bowel sounds are normal. She exhibits no distension. There is no tenderness. There is no guarding. Musculoskeletal: Normal range of motion. She exhibits no edema. Neurological: She is alert and oriented to person, place, and time. No cranial nerve deficit. Preexisting right sided deficit; motor in right arm 1/5, right leg 1/5. Sensation partially intact on right. Mild right sided facial droop. Skin: Skin is warm and dry. She is not diaphoretic. No erythema. Psychiatric: She has a normal mood and affect. Nursing note and vitals reviewed.         Diagnostic Study Results     Labs -  Recent Results (from the past 12 hour(s))   GLUCOSE, POC    Collection Time: 07/13/18  1:04 PM   Result Value Ref Range    Glucose (POC) 66 (L) 70 - 621 mg/dL   METABOLIC PANEL, BASIC    Collection Time: 07/13/18  1:20 PM   Result Value Ref Range    Sodium 142 136 - 145 mmol/L    Potassium 4.6 3.5 - 5.5 mmol/L    Chloride 105 100 - 108 mmol/L    CO2 28 21 - 32 mmol/L    Anion gap 9 3.0 - 18 mmol/L    Glucose 82 74 - 99 mg/dL    BUN 14 7.0 - 18 MG/DL    Creatinine 0.73 0.6 - 1.3 MG/DL    BUN/Creatinine ratio 19 12 - 20      GFR est AA >60 >60 ml/min/1.73m2    GFR est non-AA >60 >60 ml/min/1.73m2    Calcium 9.4 8.5 - 10.1 MG/DL   CBC WITH AUTOMATED DIFF    Collection Time: 07/13/18  1:20 PM   Result Value Ref Range    WBC 5.8 4.6 - 13.2 K/uL    RBC 4.93 4.20 - 5.30 M/uL    HGB 12.4 12.0 - 16.0 g/dL    HCT 38.7 35.0 - 45.0 %    MCV 78.5 74.0 - 97.0 FL    MCH 25.2 24.0 - 34.0 PG    MCHC 32.0 31.0 - 37.0 g/dL    RDW 15.2 (H) 11.6 - 14.5 %    PLATELET 287 131 - 972 K/uL    MPV 11.6 9.2 - 11.8 FL    NEUTROPHILS 44 40 - 73 %    LYMPHOCYTES 41 21 - 52 %    MONOCYTES 12 (H) 3 - 10 %    EOSINOPHILS 3 0 - 5 %    BASOPHILS 0 0 - 2 %    ABS. NEUTROPHILS 2.6 1.8 - 8.0 K/UL    ABS. LYMPHOCYTES 2.4 0.9 - 3.6 K/UL    ABS. MONOCYTES 0.7 0.05 - 1.2 K/UL    ABS. EOSINOPHILS 0.2 0.0 - 0.4 K/UL    ABS. BASOPHILS 0.0 0.0 - 0.1 K/UL    DF AUTOMATED     PROTHROMBIN TIME + INR    Collection Time: 07/13/18  1:20 PM   Result Value Ref Range    Prothrombin time 12.4 11.5 - 15.2 sec    INR 1.0 0.8 - 1.2     EKG, 12 LEAD, INITIAL    Collection Time: 07/13/18  2:07 PM   Result Value Ref Range    Ventricular Rate 69 BPM    Atrial Rate 69 BPM    P-R Interval 152 ms    QRS Duration 90 ms    Q-T Interval 432 ms    QTC Calculation (Bezet) 462 ms    Calculated P Axis 74 degrees    Calculated R Axis 15 degrees    Calculated T Axis 71 degrees    Diagnosis       Normal sinus rhythm  Possible Left atrial enlargement  Borderline ECG  When compared with ECG of 06-JUN-2018 18:35,  No significant change was found         Radiologic Studies -   XR CHEST PORT   Final Result      CT HEAD WO CONT   Final Result            Medical Decision Making   I am the first provider for this patient. I reviewed the vital signs, available nursing notes, past medical history, past surgical history, family history and social history. Vital Signs-Reviewed the patient's vital signs. Pulse Oximetry Analysis -  100 on room air (Interpretation) Normal    Cardiac Monitor:  Rate: 79  Rhythm:  Normal Sinus Rhythm     EKG: Interpreted by the EP.    Time Interpreted: 71   Rate: Normal Sinus    Rhythm: Normal Sinus Rhythm    Interpretation: sinus rhythm, normal intervals, normal axis, no ST elevations or depressions   Comparison: 6/6, unchanged from prior    Records Reviewed: Nursing Notes, Old Medical Records, Previous electrocardiograms, Previous Radiology Studies and Previous Laboratory Studies (Time of Review: 12:50 PM)    ED Course: Progress Notes, Reevaluation, and Consults:    Provider Notes (Medical Decision Making): 85HR F with prior left sided stroke with persistent R sided deficits presenting from her nursing home for concern for change in behavior approximately 45 minutes prior to arrival. On arrival to ED, patient initially non-responsive to providers with profound aphasia however after CT patient states \"I was just mad and didn't want to talk but I'm fine. \" She has no new deficits on exam and is at her mental baseline. Will look to treat HTN while in ED and continue basic laboratory workup for TIA/stroke. Normal glucose, do not suspect additional or alternative etiology at this time. CT unchanged from prior. Spoke with teleneurologist who agrees TIA, not TPA candidate. Labs unremarkable. Will admit for TIA workup. No complaints at time of admission. Dr. Renita lopez, will consult on admission. Dr. Zenaida Pereira to admit. For Hospitalized Patients:    1. Hospitalization Decision Time:  The decision to hospitalize the patient was made by Dr. Sahil Murray at 2:48 PM on 7/13/2018    2. Aspirin: Aspirin was given    Diagnosis     Clinical Impression:   1. Transient cerebral ischemia, unspecified type    2. Secondary hypertension        Disposition: Admit     Follow-up Information     None           Patient's Medications   Start Taking    No medications on file   Continue Taking    AMLODIPINE (NORVASC) 10 MG TABLET    Take 10 mg by mouth every evening. Indications: HYPERTENSION    ATORVASTATIN (LIPITOR) 40 MG TABLET    Take 1 Tab by mouth nightly. CALCIUM CARBONATE (CALTREX) 600 MG (1,500 MG) TABLET    Take 600 mg by mouth two (2) times a day. HYDRALAZINE (APRESOLINE) 25 MG TABLET    1 tab by mouth in morning, 2 tabs by mouth before bedtime  Indications: HYPERTENSION    LISINOPRIL (PRINIVIL, ZESTRIL) 10 MG TABLET    Take 40 mg by mouth daily.    These Medications have changed    No medications on file   Stop Taking    No medications on file _______________________________

## 2018-07-13 NOTE — PROGRESS NOTES
Dr Tamar Silva made aware of the increase in NIH scale 1 to 8 pt had prior strokes in the past with deficits will continue to monitor

## 2018-07-14 ENCOUNTER — APPOINTMENT (OUTPATIENT)
Dept: NON INVASIVE DIAGNOSTICS | Age: 78
DRG: 069 | End: 2018-07-14
Attending: INTERNAL MEDICINE
Payer: MEDICARE

## 2018-07-14 LAB
CHOLEST SERPL-MCNC: 182 MG/DL
ECHO LA AREA 4C: 9.2 CM2
ECHO LA VOL 2C: 28.81 ML (ref 22–52)
ECHO LA VOL 4C: 16.2 ML (ref 22–52)
ECHO LA VOLUME INDEX A2C: 19.51 ML/M2
ECHO LA VOLUME INDEX A4C: 10.97 ML/M2
ECHO LV E' LATERAL VELOCITY: 9.21 CM/S
ECHO LV INTERNAL DIMENSION DIASTOLIC: 3.79 CM (ref 3.9–5.3)
ECHO LV INTERNAL DIMENSION SYSTOLIC: 1.98 CM
ECHO LV IVSD: 0.95 CM (ref 0.6–0.9)
ECHO LV MASS 2D: 105.4 G (ref 67–162)
ECHO LV MASS INDEX 2D: 71.4 G/M2
ECHO LV POSTERIOR WALL DIASTOLIC: 0.79 CM (ref 0.6–0.9)
ECHO LVOT DIAM: 1.7 CM
ECHO LVOT PEAK GRADIENT: 4.7 MMHG
ECHO LVOT PEAK VELOCITY: 108.93 CM/S
ECHO LVOT VTI: 17.82 CM
ECHO MV A VELOCITY: 104.33 CM/S
ECHO MV E DECELERATION TIME (DT): 146.8 MS
ECHO MV E VELOCITY: 0.79 CM/S
ECHO MV E/A RATIO: 0.8
ECHO MV E/E' LATERAL: 8.6
ECHO RV TAPSE: 1.61 CM (ref 1.5–2)
ECHO TV REGURGITANT MAX VELOCITY: 236.27 CM/S
ECHO TV REGURGITANT PEAK GRADIENT: 22.3 MMHG
EST. AVERAGE GLUCOSE BLD GHB EST-MCNC: 97 MG/DL
GLUCOSE BLD STRIP.AUTO-MCNC: 88 MG/DL (ref 70–110)
GLUCOSE BLD STRIP.AUTO-MCNC: 97 MG/DL (ref 70–110)
HBA1C MFR BLD: 5 % (ref 4.2–5.6)
HDLC SERPL-MCNC: 64 MG/DL (ref 40–60)
HDLC SERPL: 2.8 {RATIO} (ref 0–5)
LDLC SERPL CALC-MCNC: 106.4 MG/DL (ref 0–100)
LIPID PROFILE,FLP: ABNORMAL
TRIGL SERPL-MCNC: 58 MG/DL (ref ?–150)
VLDLC SERPL CALC-MCNC: 11.6 MG/DL

## 2018-07-14 PROCEDURE — 92610 EVALUATE SWALLOWING FUNCTION: CPT

## 2018-07-14 PROCEDURE — 74011250637 HC RX REV CODE- 250/637: Performed by: INTERNAL MEDICINE

## 2018-07-14 PROCEDURE — 82962 GLUCOSE BLOOD TEST: CPT

## 2018-07-14 PROCEDURE — 97161 PT EVAL LOW COMPLEX 20 MIN: CPT

## 2018-07-14 PROCEDURE — 92526 ORAL FUNCTION THERAPY: CPT

## 2018-07-14 PROCEDURE — 65660000000 HC RM CCU STEPDOWN

## 2018-07-14 PROCEDURE — 83036 HEMOGLOBIN GLYCOSYLATED A1C: CPT | Performed by: INTERNAL MEDICINE

## 2018-07-14 PROCEDURE — 80061 LIPID PANEL: CPT | Performed by: INTERNAL MEDICINE

## 2018-07-14 PROCEDURE — 36415 COLL VENOUS BLD VENIPUNCTURE: CPT | Performed by: INTERNAL MEDICINE

## 2018-07-14 PROCEDURE — 97165 OT EVAL LOW COMPLEX 30 MIN: CPT

## 2018-07-14 PROCEDURE — 93306 TTE W/DOPPLER COMPLETE: CPT

## 2018-07-14 PROCEDURE — 74011000250 HC RX REV CODE- 250: Performed by: HOSPITALIST

## 2018-07-14 PROCEDURE — 74011250636 HC RX REV CODE- 250/636: Performed by: INTERNAL MEDICINE

## 2018-07-14 RX ORDER — SODIUM CHLORIDE 9 MG/ML
10 INJECTION INTRAMUSCULAR; INTRAVENOUS; SUBCUTANEOUS
Status: COMPLETED | OUTPATIENT
Start: 2018-07-14 | End: 2018-07-14

## 2018-07-14 RX ORDER — HALOPERIDOL 5 MG/ML
1 INJECTION INTRAMUSCULAR ONCE
Status: COMPLETED | OUTPATIENT
Start: 2018-07-14 | End: 2018-07-14

## 2018-07-14 RX ORDER — LANOLIN ALCOHOL/MO/W.PET/CERES
3 CREAM (GRAM) TOPICAL ONCE
Status: ACTIVE | OUTPATIENT
Start: 2018-07-14 | End: 2018-07-15

## 2018-07-14 RX ADMIN — Medication 10 ML: at 06:31

## 2018-07-14 RX ADMIN — CLOPIDOGREL BISULFATE 75 MG: 75 TABLET ORAL at 08:40

## 2018-07-14 RX ADMIN — HYDRALAZINE HYDROCHLORIDE 50 MG: 50 TABLET, FILM COATED ORAL at 21:09

## 2018-07-14 RX ADMIN — HYDRALAZINE HYDROCHLORIDE 25 MG: 25 TABLET, FILM COATED ORAL at 08:41

## 2018-07-14 RX ADMIN — Medication 10 ML: at 13:47

## 2018-07-14 RX ADMIN — HALOPERIDOL LACTATE 1 MG: 5 INJECTION, SOLUTION INTRAMUSCULAR at 21:03

## 2018-07-14 RX ADMIN — SODIUM CHLORIDE 10 ML: 9 INJECTION, SOLUTION INTRAMUSCULAR; INTRAVENOUS; SUBCUTANEOUS at 11:00

## 2018-07-14 RX ADMIN — ASPIRIN 81 MG 81 MG: 81 TABLET ORAL at 08:40

## 2018-07-14 RX ADMIN — Medication 10 ML: at 22:30

## 2018-07-14 NOTE — CONSULTS
Syeda Patel is a 70 y.o., left handed female, with an established history of stroke leaving her with a right hemiparesis, probably with a history of hypertension, but clearly non-adherent with medical care, who came to the hospital for an episode of poorly responsiveness during lunch yesterday in the [facility]CF she's staying at. She does have a history of a stroke leaving him with right hemiparesis. Her current event causing to have some difficulty with speech that seems to resolve. She has never had a seizure. No convulsive activity was identified. Social History; she states she's , but lives in an [facility]. She doesn't smoke, drink nor use illicit drugs. Retired teacher. Family History; mother and father had no known medical history. She claims her mother's alive, father passed from unknown causes.       Current Facility-Administered Medications   Medication Dose Route Frequency Provider Last Rate Last Dose    amLODIPine (NORVASC) tablet 10 mg  10 mg Oral QPM Hernán Delacruz MD   Stopped at 07/13/18 1800    atorvastatin (LIPITOR) tablet 40 mg  40 mg Oral QHS Hernán Delacruz MD        lisinopril (PRINIVIL, ZESTRIL) tablet 40 mg  40 mg Oral DAILY eHrnán Delacruz MD        sodium chloride (NS) flush 5-10 mL  5-10 mL IntraVENous Q8H Hernán Delacruz MD   10 mL at 07/14/18 0631    sodium chloride (NS) flush 5-10 mL  5-10 mL IntraVENous PRN Hernán Delacruz MD        clopidogrel (PLAVIX) tablet 75 mg  75 mg Oral DAILY Hernán Delacruz MD   75 mg at 07/14/18 0840    aspirin chewable tablet 81 mg  81 mg Oral DAILY Hernán Delacruz MD   81 mg at 07/14/18 0840    acetaminophen (TYLENOL) tablet 650 mg  650 mg Oral Q4H PRN Hernán Delacruz MD        heparin (porcine) injection 5,000 Units  5,000 Units SubCUTAneous Polo Chin MD        hydrALAZINE (APRESOLINE) tablet 25 mg  25 mg Oral DAILY Hernán Delacruz MD   25 mg at 07/14/18 0841    And    hydrALAZINE (APRESOLINE) tablet 50 mg  50 mg Oral QHS Radhika Yoo MD   50 mg at 07/13/18 2453       Past Medical History:   Diagnosis Date    Hypertension     Stroke [facility])        No past surgical history on file. No Known Allergies    Patient Active Problem List   Diagnosis Code    CVA (cerebral vascular accident) ([facility] M64.4    Diastolic heart failure secondary to hypertension (HCC) I11.0, I50.30    Stage 2 chronic kidney disease due to benign hypertension I12.9, N18.2    Cholelithiasis K80.20    Hiatal hernia K44.9    Diverticulosis of colon K57.30    H/O sinus bradycardia Z86.79    Hyperlipidemia E78.5    Hypertension I10    CVA (cerebrovascular accident due to intracerebral hemorrhage) ([facility] I61.9    Cerebrovascular accident (CVA) ([facility] [facility] I63.9    TIA (transient ischemic attack) G45.9         Review of Systems: she denies everything, just wants to go home. As above otherwise 11 point review of systems negative including;   Constitutional no fever or chills  Skin denies rash or itching  HENT  Denies tinnitus, hearing lose  Eyes denies diplopia vision lose  Respiratory denies shortness of breath  Cardiovascular denies chest pain, dyspnea on exertion  Gastrointestinal denies nausea, vomiting, diarrhea, constipation  Genitourinary denies incontinence  Musculoskeletal denies joint pain or swelling  Endocrine denies weight change  Hematology denies easy bruising or bleeding   Neurological as above in HPI      PHYSICAL EXAMINATION:      VITAL SIGNS:    Visit Vitals    /73 (BP 1 Location: Left arm, BP Patient Position: At rest)    Pulse 81    Temp 98.5 °F (36.9 °C)    Resp 17    Ht 5' 2\" (1.575 m)    Wt 49.4 kg (109 lb)    SpO2 96%    Breastfeeding No    BMI 19.94 kg/m2       GENERAL: The patient is well developed, well nourished, and in no apparent distress. EXTREMITIES: No clubbing, cyanosis, or edema is identified. Pulses 2+ and symmetrical.  Muscle tone is normal.  HEAD:   Ear, nose, and throat appear to be without trauma.   The patient is normocephalic. NEUROLOGIC EXAMINATION    MENTAL STATUS: The patient is awake, alert, and oriented x 3. Fund of knowledge is adequate. Speech is fluent and memory appears to be ok for short term remembering 3/3 items in 2minute. CRANIAL NERVES: II  Visual fields are full to confrontation. Funduscopic examination reveals flat disks bilaterally. Pupils are both 3 mm and briskly reactive to light. III, IV, VI  Extraocular movements are intact and there is no nystagmus. V  Facial sensation is intact to pinprick and light touch. VII  Face is symmetrical.   VIII - Hearing is present. IX, X, [address] rises symmetrically. Gag is present. Tongue is in the midline. XI - Shoulder shrugging and head turning intact  MOTOR:  The patient is weak in the right arm and leg, she's 2/5 in the arm and 3/5 in the leg. Tone increased on the right side. Sensory examination is intact to pinprick, light touch and position sense testing. Reflexes are increased on the right side, right plantar mute. Left downgoing. Cerebellar examination reveals no gross ataxia or dysmetria. Gait is right hemiparetic.        Final result (Exam End: 7/13/2018 12:59 PM) Open    Study Result   CT HEAD WO CONT     History: Prior stroke with right sided residual weakness and right facial droop.         Comparison: 12/23/2016     TECHNIQUE: 5 mm helical scan obtained of the head were obtained from the skull  vertex through the base of the skull without intravenous contrast.       All CT scans at this facility are performed using dose optimization technique as  appropriate to a performed exam, to include automated exposure control,  adjustment of the mA and/or kV according to patient size (including appropriate  matching first site-specific examinations), or use of iterative reconstruction  technique.     BRAIN RESULT:    No significant interval change in the bilateral supratentorial hypodensities  with both focal and confluent hypodense regions. Likely remote infarcts left  frontal lobe, left greater than right basal ganglia. Ex vacuo enlargement of the  left lateral ventricles, as before. Asymmetric volume loss, left greater than  right. Significant atherosclerosis. No acute intracranial hemorrhage or  extra-axial fluid collection. No mass or midline shift.     Orbits, soft tissues and osseous structures grossly unremarkable. Mild ethmoid  sinus mucosal thickening. Limited visualized paranasal sinuses and mastoid air  cells are patent.        IMPRESSION  IMPRESSION:       No acute findings or significant interval change.     Results called to Dr. Jaceil Jerez on 7/13/2018 at 1305 hours, per stroke alert  protocol. Final result (Exam End: 7/13/2018  8:22 PM) Open    Study Result   MRA NECK ARTERIES WITHOUT AND WITH CONTRAST     CPT CODE: 28210     HISTORY: Altered mental status, previous strokes.     COMPARISON: None.     TECHNIQUE: Neck arteries were scanned with 2D TOF technique, non contrast, in  the region of the carotid bifurcations. Neck arteries were scanned with a  volume acquisition technique, without and with gadolinium, from around the level  of the aortic arch up to around the level of the skull base. Appropriate  reformats were performed.     FINDINGS:     Aortic arch is normal in caliber. Conventional arch anatomy the great vessels.     Innominate artery is patent.     Bilateral subclavian arteries are patent.     The right common carotid artery is widely patent to the carotid bifurcation. The  carotid bifurcation is patent. The cervical right internal carotid arteries  normal in course and caliber and widely patent to the skull base.     The left common carotid artery is not visualized at the origin, perhaps  stenotic. The left common carotid arteries otherwise widely patent to the  carotid bifurcation. Carotid bifurcation is patent. External carotid trunk is  patent.  Cervical left internal carotid arteries normal in course and caliber and  widely patent to the skull base.     Bilateral vertebral arteries demonstrate patent origins. The dominant left and  the mildly atretic right vertebral arteries are otherwise patent to the skull  base. Intradural segment of the right vertebral artery is highly stenotic at the  mid V4 segment, and the left vertebral artery is widely patent. Mild  irregularity and possible mild stenosis intradural segment of the proximal left  vertebral artery.     Time-of-flight images demonstrate antegrade flow bilateral vertebral arteries  and patent carotid bifurcations.              IMPRESSION  IMPRESSION:     High-grade stenosis intradural segment of the nondominant right vertebral  artery, widely patent dominant left vertebral artery with mild irregularity at  the V4 segment.     Patent carotid bifurcations.     Origin of the left common carotid artery is not well visualized, and could be  stenotic. I have reviewed the above imagines myself. CBC:   Lab Results   Component Value Date/Time    WBC 5.8 07/13/2018 01:20 PM    RBC 4.93 07/13/2018 01:20 PM    HGB 12.4 07/13/2018 01:20 PM    HCT 38.7 07/13/2018 01:20 PM    PLATELET 415 34/05/7380 01:20 PM     BMP:   Lab Results   Component Value Date/Time    Glucose 82 07/13/2018 01:20 PM    Sodium 142 07/13/2018 01:20 PM    Potassium 4.6 07/13/2018 01:20 PM    Chloride 105 07/13/2018 01:20 PM    CO2 28 07/13/2018 01:20 PM    BUN 14 07/13/2018 01:20 PM    Creatinine 0.73 07/13/2018 01:20 PM    Calcium 9.4 07/13/2018 01:20 PM     CMP:   Lab Results   Component Value Date/Time    Glucose 82 07/13/2018 01:20 PM    Sodium 142 07/13/2018 01:20 PM    Potassium 4.6 07/13/2018 01:20 PM    Chloride 105 07/13/2018 01:20 PM    CO2 28 07/13/2018 01:20 PM    BUN 14 07/13/2018 01:20 PM    Creatinine 0.73 07/13/2018 01:20 PM    Calcium 9.4 07/13/2018 01:20 PM    Anion gap 9 07/13/2018 01:20 PM    BUN/Creatinine ratio 19 07/13/2018 01:20 PM    Alk.  phosphatase 64 12/22/2016 10:36 PM    Protein, total 7.7 12/22/2016 10:36 PM    Albumin 3.4 12/22/2016 10:36 PM    Globulin 4.3 (H) 12/22/2016 10:36 PM    A-G Ratio 0.8 12/22/2016 10:36 PM     Coagulation:   Lab Results   Component Value Date/Time    Prothrombin time 12.4 07/13/2018 01:20 PM    INR 1.0 07/13/2018 01:20 PM    aPTT 70.8 (H) 02/11/2016 08:35 AM          Impression: It seems like a TIA in this patient who has risk factors including prior stroke, hypertension, possible medication nonadherence. It is difficult to say what is going on with this patient given that her history is very disjointed and she may suffer from a mild dementia. Clearly she claims to not be taking any medications even though she supposed to be on antihypertensives. She was not on any antiplatelet therapy. I do not that she had a small hemorrhagic component to her stroke back in 2016 that should be of little or no clinical consequence at this time. Plan: I would treated with dual antiplatelet therapy for the next 3 months then just aspirin therapy. The aspirin dose down to 81 mg. She needs better control of her hypertension risk factors. Full medical therapy including statins for her cholesterol. No further workup is warranted at this time. PLEASE NOTE:   This document has been produced using voice recognition software. Unrecognized errors in transcription may be present.

## 2018-07-14 NOTE — PROGRESS NOTES
Problem: Mobility Impaired (Adult and Pediatric)  Goal: *Acute Goals and Plan of Care (Insert Text)  Physical Therapy Goals  Initiated 7/14/2018 and to be accomplished within 7 day(s)  1. Patient will move from supine to sit and sit to supine  in bed with modified independence. 2.  Patient will transfer from bed to chair and chair to bed with supervision/set-up using the least restrictive device. 3.  Patient will perform sit to stand with supervision/set-up. 4.  Patient will ambulate with supervision/set-up for 200 feet with the least restrictive device. 5.  Patient will ascend/descend 4 stairs with 1 handrail(s) with minimal assistance/contact guard assist.  physical Therapy EVALUATION    Patient: Kirt Hastings (53 y.o. female)  Date: 7/14/2018  Primary Diagnosis: TIA (transient ischemic attack)  TIA (transient ischemic attack)        Precautions: fall       ASSESSMENT :  Based on the objective data described below, the patient presents with decreased strength, balance and activity tolerance resulting in decreased independence with functional mobility. Pt has residual right sided weakness from previous CVA. Pt transferred supine to sit with supervision and sit to stand with SBA. Pt ambulated 100 feet with min A and HHA with step to gait pattern and increased trunk sway. Pt denies using a cane prior to admission however several comments are made in her chart referring to a quad cane. Pt required min A to raise right LE into the bed to return to supine. Pt was left with needs in reach and was educated on calling for assistance to get up. Patient will benefit from skilled intervention to address the above impairments.   Patients rehabilitation potential is considered to be Fair  Factors which may influence rehabilitation potential include:   []         None noted  [x]         Mental ability/status  [x]         Medical condition  []         Home/family situation and support systems  [x] Safety awareness  []         Pain tolerance/management  []         Other:      PLAN :  Recommendations and Planned Interventions:  [x]           Bed Mobility Training             [x]    Neuromuscular Re-Education  [x]           Transfer Training                   []    Orthotic/Prosthetic Training  [x]           Gait Training                          []    Modalities  [x]           Therapeutic Exercises          []    Edema Management/Control  [x]           Therapeutic Activities            [x]    Patient and Family Training/Education  []           Other (comment):    Frequency/Duration: Patient will be followed by physical therapy 1-2 times per day/4-7 days per week to address goals. Discharge Recommendations: Home Health with 24 hour supervision  Further Equipment Recommendations for Discharge: quad cane     G-CODES      Mobility  Current  CJ= 20-39%   Goal  CI= 1-19%. The severity rating is based on the Level of Assistance required for Functional Mobility and ADLs. Eval Complexity: History: MEDIUM  Complexity : 1-2 comorbidities / personal factors will impact the outcome/ POC Exam:MEDIUM Complexity : 3 Standardized tests and measures addressing body structure, function, activity limitation and / or participation in recreation  Presentation: LOW Complexity : Stable, uncomplicated  Clinical Decision Making:Low Complexity , Overall Complexity:LOW     SUBJECTIVE:   Patient stated I'm doing fine.     OBJECTIVE DATA SUMMARY:     Past Medical History:   Diagnosis Date    Hypertension     Stroke ([facility] [address]    No past surgical history on file.   Prior Level of Function/Home Situation: patient reports living with her  and ambulating without an assistive device however several areas of the chart refer to patient residing in an assisted living facility and ambulating with a quad cane; no family is present at time of evaluation to clarify  Critical Behavior:  Neurologic State: Alert;Eyes open spontaneously;Confused  Orientation Level: Oriented to person;Oriented to place; Disoriented to situation;Disoriented to time  Cognition: Follows commands;Decreased attention/concentration; Impaired decision making; Impulsive  Safety/Judgement: Awareness of environment     Strength:    Strength:  (R LE 2+/5, L LE 4/5)      Tone & Sensation:   Tone: Abnormal       Range Of Motion:  AROM: Generally decreased, functional   Functional Mobility:  Bed Mobility:  Supine to Sit: Supervision  Sit to Supine: Minimum assistance     Transfers:  Sit to Stand: Stand-by assistance  Stand to Sit: Stand-by assistance  Balance:   Sitting: Intact  Standing: Without support  Standing - Static: Fair  Standing - Dynamic :  (fair-)  Ambulation/Gait Training:  Distance (ft): 100 Feet (ft)  Assistive Device:  (hand held assist)  Ambulation - Level of Assistance: Minimal assistance  Gait Abnormalities: Trunk sway increased; Step to gait; Decreased step clearance  Pain:  Pt reports 0/10 pain or discomfort prior to treatment.    Pt reports 0/10 pain or discomfort post treatment. Activity Tolerance:   Fair-  Please refer to the flowsheet for vital signs taken during this treatment. After treatment:   []         Patient left in no apparent distress sitting up in chair  [x]         Patient left in no apparent distress in bed  [x]         Call bell left within reach  [x]         Nursing notified  []         Caregiver present  []         Bed alarm activated    COMMUNICATION/EDUCATION:   [x]         Fall prevention education was provided and the patient/caregiver indicated understanding. [x]         Patient/family have participated as able in goal setting and plan of care. [x]         Patient/family agree to work toward stated goals and plan of care. []         Patient understands intent and goals of therapy, but is neutral about his/her participation. []         Patient is unable to participate in goal setting and plan of care.   Educated patient on calling for assistance with mobility    Thank you for this referral.  Carlos Townsend, PT   Time Calculation: 15 mins

## 2018-07-14 NOTE — PROGRESS NOTES
Bedside shift change report given to Monty Limon RN (oncoming nurse) by Sushil Adhikari RN (offgoing nurse). Report included the following information SBAR, Recent Results and Med Rec Status.

## 2018-07-14 NOTE — PROGRESS NOTES
Patient states she wants to leave AMA. Dr. Audrey Ceja made aware. Patient non-compliant with bed alarm although she was educated by this RN. Patient also refused to urinate in hat to collect urine specimen. Dr. Audrey Ceja made aware of this as well.

## 2018-07-14 NOTE — PROGRESS NOTES
Patient is asking where is her cane called the ED and spoke to [address] [place] [address] and she stated that pt did not come in with a cane.  Followed up with Ki Johnson at [address] [place] [address] regarding pt's cane and she stated that the patient's cane is at their facility

## 2018-07-14 NOTE — PROGRESS NOTES
Dr. Kaylynn Simmons made aware that patient was noncompliant with fall precautions, she is ambulatory but needs assistance but refuses assistance. Patient stated she wanted to be discharged. Dr. Kaylynn Simmons was going to assess patient.

## 2018-07-14 NOTE — PROGRESS NOTES
Rapid Response Note [facility] Patient: Perla Powell 70 y.o. female [id number] 
1940 Admit Date: 7/13/2018 Admission Diagnosis: TIA (transient ischemic attack) TIA (transient ischemic attack) RAPID RESPONSE Rapid response called for altered mental status. RN states pt has just returned from MRI. She did not have a chance to assess pt prior to MRI, but when she returned states pt is not responding appropriately, has brief periods of non-responsiveness, appears to have R side paralysis. Upon further investigation nurse finds notes stating Ms. Aishwarya Coles has been angry since admission and completely non-cooperative. ED note reviewed that shows pt came in for similar behavior earlier today. CT Head was negative, teleneurology was consulted and rec'ed admission and TIA work-up. On arrival pt BPs were 210s/100s, she was laying comfortably in bed facing the door with eyes closed and clinching her jaw, but non-responsive to commands. However, when palpitation her R calf she opened her eyes, flexed forward and kicked me with her L leg and aggresively told me to leave her alone. I walked out to speak to the nurse and returned to Ms. Swan facing the other direction, again eyes closed, and clenching her jaw. MRI came back while talking to Dr. Kannan Moe which showed no acute stroke. Medications Reviewed OBJECTIVE Visit Vitals  /76  Pulse 62  Temp 97.4 °F (36.3 °C)  Resp 16  
 Ht 5' 2.5\" (1.588 m)  Wt 49.5 kg (109 lb 1.6 oz)  SpO2 100%  Breastfeeding No  
 BMI 19.64 kg/m2 PHYSICAL: 
General:  Completely non-cooperative, in no acute distress. Agitated. Verbal 
CV:  RRR, no murmurs, rubs, or gallops. No visible pulsations or thrills. L carotid bruit. RESP:  Unlabored breathing. Lungs clear to auscultation. no wheeze, rales, or rhonchi. Equal expansion bilaterally. ABD:  Soft, nontender, nondistended.   Normoactive bowel sounds. Neuro:  Completely non-cooperative. Sensation intact BLLE, motor intact LLE (evaluated by agitating pt). Medications administered: Continued Home meds EKG:Sinus rhythm at 70 bpm, no ectopy, no ST elevation or depression Labs: None ordered, labs done on admission reviewed ASSESSMENT, PLAN & DISPOSITION Emelina Hughes is a 70y.o. year old female admitted for TIA (transient ischemic attack) TIA (transient ischemic attack). Rapid response called for AMS. Patient condition currently: stable. Discussed risks of Hypertensive Encephalopathy with Dr. Nydia Gomez. She does not have a stroke as per MRI, nullifying the necessity for permissive hypertension. Unsure which of her home meds she has been taking. Will start her Lisinopril 40mg now and continue her home meds with a goal of 25% reduction in BPs. Disposition:  Unchanged, continue current care. Attending Dr. Nydia Gomez notified of rapid response. In agreement with plan. Primary team resuming care.   
 
Yoselin Haile DO, PGY I 
[facility] 
07/13/18 
9:50 PM

## 2018-07-14 NOTE — PROGRESS NOTES
Bedside and Verbal shift change report given to [address] [place] [address] (oncoming nurse) by Ronaldo Cook RN   (offgoing nurse). Report given with SBAR, Kardex, MAR and Recent Results.

## 2018-07-14 NOTE — PROGRESS NOTES
Problem: Dysphagia (Adult)  Goal: *Acute Goals and Plan of Care (Insert Text)  Recommendations:  Diet: Dental soft, thin liquid   Meds: Per patient preference  Aspiration Precautions  Oral Care TID    Goals:  Patient will:  1. Tolerate PO trials with 0 s/s overt distress in 4/5 trials  2. Utilize compensatory swallow strategies/maneuvers (decrease bite/sip, size/rate, alt. liq/sol) with min cues in 4/5 trials  3. Perform oral-motor/laryngeal exercises to increase oropharyngeal swallow function with min cues  4. Complete an objective swallow study (i.e., MBSS) to assess swallow integrity, r/o aspiration, and determine of safest LRD, min A      Outcome: Progressing Towards Goal    Speech LAnguage Pathology bedside swallow   evaluation & TREATMENT     Patient: Sher Michaud (59 y.o. female)  Date: 7/14/2018  Primary Diagnosis: TIA (transient ischemic attack)  TIA (transient ischemic attack)        Precautions: Aspiration     PLOF: Independent  ASSESSMENT :  Based on the objective data described below, the patient presents with mild oropharyngeal dysphagia in the setting of TIA. A&O to self and location. Pt agitated at SLP ?s, required encouragement for responses and PO intake. Oral-motor exam revealed pt with poor dentition; however, all other structures grossly intact for mastication and deglutition. Patient self-fed thin liquid + straw, puree and solid cracker trials. Demo mildly delayed mastication with solid trial, 0 overt s/s of aspiration. Recommend dental soft, thin liquid diet with aspiration precautions. D/w RN, Chel Mcelroy. Skilled therapy initiated; Educated pt on aspiration precautions and importance of compensatory swallow techniques to decrease aspiration risk (decrease rate of intake & sip/bite size, upright @HOB for all po intake and ~30 minutes after po); verbalized comprehension, needs reinforcement. Patient will benefit from skilled intervention to address the above impairments.   Patients rehabilitation potential is considered to be Good  Factors which may influence rehabilitation potential include:   []            None noted  []            Mental ability/status  []            Medical condition  []            Home/family situation and support systems  [x]            Safety awareness  []            Pain tolerance/management  []            Other:      PLAN :  Recommendations and Planned Interventions:  Dental soft, thin liquid   Frequency/Duration: Patient will be followed by speech-language pathology 1-2 times per day/4-7 days per week to address goals. Discharge Recommendations: To Be Determined     SUBJECTIVE:   Patient stated Clovia Bamberger does everybody keep bothering me? . OBJECTIVE:     Past Medical History:   Diagnosis Date    Hypertension     Stroke ([facility] [address]    No past surgical history on file. Prior Level of Function/Home Situation: Independent  Home Situation  Home Environment: Other (comment) (house)  # Steps to Enter: 4  One/Two Story Residence: One story  Living Alone: No  Support Systems: Family member(s), [employer] / avel community  Patient Expects to be Discharged to[de-identified] Other (comment) (home)  Current DME Used/Available at Home: Cane, quad  Diet prior to admission: Regular/thin  Current Diet:  Dental soft/thin   Cognitive and Communication Status:  Neurologic State: Alert, Confused  Orientation Level: Oriented to person, Oriented to place, Disoriented to situation, Disoriented to time  Cognition: Decreased attention/concentration, Follows commands, Impulsive  Perception: Appears intact  Perseveration: No perseveration noted  Safety/Judgement: Awareness of environment  Oral Assessment:  Oral Assessment  Labial: No impairment  Dentition: Natural;Limited  Oral Hygiene: Fair  Lingual: No impairment  Velum: Unable to visualize  Mandible: No impairment  P.O. Trials:  Patient Position: HOB 45  Vocal quality prior to P.O.: No impairment  Consistency Presented: Thin liquid; Solid;Puree  How Presented: Self-fed/presented;Straw;Successive swallows     Bolus Acceptance: No impairment  Bolus Formation/Control: Impaired  Type of Impairment: Delayed;Mastication  Propulsion: No impairment  Oral Residue: None  Initiation of Swallow: No impairment  Laryngeal Elevation: Functional  Aspiration Signs/Symptoms: None  Pharyngeal Phase Characteristics: No impairment, issues, or problems   Effective Modifications: Small sips and bites  Cues for Modifications: Minimal       Oral Phase Severity: Mild  Pharyngeal Phase Severity : No impairment    GCODESwallowing:  Swallow Current Status CI= 1-19%   Swallow Goal Status CH= 0%    The severity rating is based on the following outcomes:  ROSANGELA Noms Swallow Level 6    Clinical Judgement    PAIN:  Start of Eval/Tx: 0  End of Eval/Tx: 0     After treatment:   []            Patient left in no apparent distress sitting up in chair  [x]            Patient left in no apparent distress in bed  [x]            Call bell left within reach  [x]            Nursing notified  []            Family present  []            Caregiver present  []            Bed alarm activated    COMMUNICATION/EDUCATION:   [x]            Safe swallowing guidelines; compensatory techniques. [x]            Patient/family have participated as able in goal setting and plan of care. []            Patient/family agree to work toward stated goals and plan of care. [x]            Patient understands intent and goals of therapy; neutral about participation. []            Patient unable to participate in goal setting/plan of care; educ ongoing with interdisciplinary staff  []         Posted safety precautions in patient's room.     Thank you for this referral.  MAGUI Marcelino  Time Calculation: 20 mins  Evaluation Time: 10 minutes   Treatment Time: 10 minutes

## 2018-07-14 NOTE — PROGRESS NOTES
Problem: Self Care Deficits Care Plan (Adult)  Goal: *Acute Goals and Plan of Care (Insert Text)  Occupational Therapy EVALUATION/discharge    Patient: Zeus Figueredo (25 y.o. female)  Date: 7/14/2018  Primary Diagnosis: TIA (transient ischemic attack)  TIA (transient ischemic attack)  Precautions: none listed      ASSESSMENT AND RECOMMENDATIONS:  Based on the objective data described below, the patient presents with out functional deficits that she would allow to surface during OT evaluation. She requires therapeutic use of self in order for her limited participation. She demonstrated independence with simulated LB and UB dressing and is independent standing and maneuvering in her room during simulated ADL tasks. Her non functional RUE appears long standing. She appears to resist demonstrating difficult tasks to this therapist at this time; therefore, skilled occupational therapy is not currently indicated. Will be available should her needs change or concerns arise. Discharge Recommendations: possible home health if this patient will allow it  Further Equipment Recommendations for Discharge: N/A      Barriers to Learning/Limitations: yes; Emotional (she appears angry and is resistive to evaluation of her abilities)  Compensate with: therapeutic use of self    COMPLEXITY     Eval Complexity: History: LOW Complexity : Brief history review ; Examination: LOW Complexity : 1-3 performance deficits relating to physical, cognitive , or psychosocial skils that result in activity limitations and / or participation restrictions ; Decision Making:LOW Complexity : No comorbidities that affect functional and no verbal or physical assistance needed to complete eval tasks  Assessment: LOW Complexity        G-CODES:     Self Care  Current  CI= 1-19%   Goal  CI= 1-19%   D/C  CI= 1-19%. The severity rating is based on the Level of Assistance required for Functional Mobility and ADLs.     SUBJECTIVE:   Patient stated I drive.  concerned if this is accurate as she presents with a non functional right hand    OBJECTIVE DATA SUMMARY:     Past Medical History:   Diagnosis Date    Hypertension     Stroke ([address]    No past surgical history on file.   Prior Level of Function/Home Situation: she reports she is independent with her self care skills and declines to elaborate   Home Situation  Home Environment: Other (comment) (house)  # Steps to Enter: 4  One/Two Story Residence: One story  Living Alone: No  Support Systems: Family member(s), [employer] / avel community  Patient Expects to be Discharged to[de-identified] Other (comment) (home)  Current DME Used/Available at Home: Cane, quad  [x]     Right hand dominant   []     Left hand dominant  Cognitive/Behavioral Status:  She is oriented to self only  Skin: no skin integrity issues noted during OT evaluation  Edema: no extremity edema is noted  Vision/Perceptual:  Limited evaluation able to be completed; tracking appears to be [facility]    Coordination:  RUE: non functional other than a mod impaired gross assist during simulated self care tasks  Balance:  Sitting: Intact  Standing: Without support  Standing - Static: Fair  Standing - Dynamic :  (fair-)  Strength:  RUE: 2-/5 in available range  LUE: 4/5  Tone & Sensation:  RUE: limited assessment; tone and contractures apparent throughout  LUE: WFL  Range of Motion:  RUE: limited assessment; AROM approximately 20% full  LUE: AROM WFL  Functional Mobility and Transfers for ADLs:  Bed Mobility:  Supine to Sit: Supervision  Sit to Supine: Minimum assistance  Transfers:   modified independent (additional time and effort)   ADL Assessment: limited assessment   self feeding: modified independent (simulated)  Grooming: modified independent (simulated)  UB bathing/dressing: she reports she is independent and is aware to place her RUE in shirt first and declines to complete or simulate  LB bathing/dressing: she reports she is independent and declines to complete or simulate   Pain: she is minimally communicating this morning during OT evaluation  Pt reports 0/10 pain or discomfort prior to treatment.    Pt reports 0/10 pain or discomfort post treatment. Activity Tolerance:   No SOB noted  Please refer to the flowsheet for vital signs taken during this treatment. After treatment:   [x]  Patient left in no apparent distress sitting up in chair  []  Patient left in no apparent distress in bed  []  Call bell left within reach  []  Nursing notified  []  Caregiver present  []  Bed alarm activated    COMMUNICATION/EDUCATION:   Communication/Collaboration:  []      Home safety education was provided and the patient/caregiver indicated understanding. []      Patient/family have participated as able and agree with findings and recommendations. [x]      Patient is unable to participate in plan of care at this time.     Jorge Vann OTR/L  Time Calculation: 9 mins

## 2018-07-14 NOTE — PROGRESS NOTES
[facility] Hospitalist Group  Progress Note    Patient: Andreas Nagel Age: 70 y.o. : 1940 MR#: [id number] SSN: [id number]  Date: 2018     Subjective:     Pt states she feels fine and states will like to go home. Pt appears confused. Attempted to discuss with son - Cassidy Le about updates, son appears agitated about pt care. Attending d/w son at length. Assessment/Plan:   1. Transient Ischemic Attack: Neurology consulted, cont ASA, plavix, and statin. 2. H/o CVA with Multifocal remote microhemorrhages bilateral cerebral hemispheres, cerebellum and vermis: cont ASA, plavix, and statin. Neurology recommends to continue dual plt tx with known hx of hemorrhage. 3. Uncontrolled HTN: BP higher side, will cont Amlodipine, hydralazine and Lisinopril. 4. Hyperlipidemia: cont statin. 5. Dementia: supportive care, check UA and cx.   6. Full code       Goals of care: Full code  Disposition:  [x]PT/OT ordered   [x] Case management referral   Pt came from [facility] - D/w , they do not accept pt on weekends. D/w nurse at [facility], will fax over [facility]. Med list reviewed with [facility].      Case discussed with:  [x]Patient  [x]Family  [x]Nursing  [x]Case Management  DVT Prophylaxis:  []Lovenox  [x]Hep SQ  []SCDs  []Coumadin   []On Heparin gtt    Objective:   VS:   Visit Vitals    BP (!) 156/95 (BP 1 Location: Left arm, BP Patient Position: Sitting)    Pulse 98    Temp 97.8 °F (36.6 °C)    Resp 18    Ht 5' 2\" (1.575 m)    Wt 49.4 kg (109 lb)    SpO2 98%    Breastfeeding No    BMI 19.94 kg/m2      Tmax/24hrs: Temp (24hrs), Av.9 °F (36.6 °C), Min:97.1 °F (36.2 °C), Max:98.8 °F (37.1 °C)    Intake/Output Summary (Last 24 hours) at 18 0766  Last data filed at 18 1057   Gross per 24 hour   Intake                0 ml   Output              802 ml   Net             -802 ml       General:  Awake, NAD, appears confused  Cardiovascular:  RRR  Pulmonary: CTA   GI: NT, normal BS  Extremities:  No edema or cyanosis  Neuro: AAOx2. Right residual hemiplegia from previous stroke, no other focal neurological deficits. No facial droop.      Labs:    Recent Results (from the past 24 hour(s))   GLUCOSE, POC    Collection Time: 07/13/18  6:39 PM   Result Value Ref Range    Glucose (POC) 99 70 - 110 mg/dL   GLUCOSE, POC    Collection Time: 07/13/18  9:23 PM   Result Value Ref Range    Glucose (POC) 89 70 - 110 mg/dL   LIPID PANEL    Collection Time: 07/14/18  3:30 AM   Result Value Ref Range    LIPID PROFILE          Cholesterol, total 182 <200 MG/DL    Triglyceride 58 <150 MG/DL    HDL Cholesterol 64 (H) 40 - 60 MG/DL    LDL, calculated 106.4 (H) 0 - 100 MG/DL    VLDL, calculated 11.6 MG/DL    CHOL/HDL Ratio 2.8 0 - 5.0     HEMOGLOBIN A1C WITH EAG    Collection Time: 07/14/18  3:30 AM   Result Value Ref Range    Hemoglobin A1c 5.0 4.2 - 5.6 %    Est. average glucose 97 mg/dL   GLUCOSE, POC    Collection Time: 07/14/18  7:00 AM   Result Value Ref Range    Glucose (POC) 88 70 - 110 mg/dL   ECHO ADULT COMPLETE    Collection Time: 07/14/18 12:23 PM   Result Value Ref Range    LVIDd 3.79 (A) 3.9 - 5.3 cm    LVPWd 0.79 0.6 - 0.9 cm    LVIDs 1.98 cm    IVSd 0.95 (A) 0.6 - 0.9 cm    LVOT d 1.70 cm    LVOT Peak Velocity 108.93 cm/s    LVOT Peak Gradient 4.7 mmHg    LVOT VTI 17.82 cm    LV E' Lateral Velocity 9.21 cm/s    MV A Leo 104.33 cm/s    MV E Leo 0.79 cm/s    MV E/A 0.8     LA Vol 4C 16.20 (A) 22 - 52 mL    LA Vol 2C 28.81 22 - 52 mL    LA Area 4C 9.2 cm2    LV Mass .4 67 - 162 g    LV Mass AL Index 71.4 g/m2    E/E' lateral 8.6     Mitral Valve E Wave Deceleration Time 146.8 ms    Tapse 1.61 1.5 - 2.0 cm    Triscuspid Valve Regurgitation Peak Gradient 22.3 mmHg    TR Max Velocity 236.27 cm/s    LA Vol Index 19.51 ml/m2    LA Vol Index 10.97 ml/m2   GLUCOSE, POC    Collection Time: 07/14/18 12:45 PM   Result Value Ref Range    Glucose (POC) 97 70 - 110 mg/dL       Signed By: Juan Stevens PA-C     July 14, 2018 5:36 PM        I have personally seen, evaluated and examined the patient. Agree with documentation of assessment and plan as documented by Juan Stevens PA-C. Pt AAOx2, confused and wants to go home. Pt lives at assisted living   Exam: mild R side residual weakness, speech clear. D/w neuro Dr. Noah Light, MRI/MRA/CT reviewed, old Hx cerebellar hemorrhage noted. Neuro recommended asa and plavix for 3 months and better BP control. D/w son Mr. Scar Mcclelland over phone for >45 mins. Pt's son very rude and blunt, had multiple questions about previous admission in 5/2016, ED visit on 6/6/18 and wanted details of all test results, meds and reasons for why BP is high. Per him pt was off all BP med's for >1.5 yrs and pt started having problems after she was started on BP med's. He did not wanted pt to be on >1 BP med's but he agreed to cont current med's after I spoke to him and explained him risk of uncontrolled HTN very detaily. I have spent more than 45 mins just taking to him on phone. Called and obtained med info from assisted living, he still seemed not satisfied with care.      I spent 60 minutes with the patient in face-to-face consultation, of which greater than 50% was spent in counseling and coordination of care as described above

## 2018-07-14 NOTE — PROGRESS NOTES
Pt's sister Rob Addison [phone] is at pt's bedside and she reports pt is from [facility] and will be returning to [facility] when discharged. She states pt's son Colleen Donnelly is in [place].

## 2018-07-14 NOTE — PROGRESS NOTES
Patient came back from MRI, nurse was doing initial assessment. Noticed pt is acting different in comparison from previous nurse's assessment. Patient non verbal, not following commands, very lethargic, won't keep eyes open for more than a few seconds. Rapid called. Vitals taken. EKG done. Patient started getting agitated with interventions, moving extremeties and talking. Patient is upset about admission, therefore was very non compliant with assessment  No further interventions required.

## 2018-07-15 LAB
ANION GAP SERPL CALC-SCNC: 7 MMOL/L (ref 3–18)
BASOPHILS # BLD: 0 K/UL (ref 0–0.1)
BASOPHILS NFR BLD: 0 % (ref 0–2)
BUN SERPL-MCNC: 11 MG/DL (ref 7–18)
BUN/CREAT SERPL: 17 (ref 12–20)
CALCIUM SERPL-MCNC: 9.1 MG/DL (ref 8.5–10.1)
CHLORIDE SERPL-SCNC: 108 MMOL/L (ref 100–108)
CO2 SERPL-SCNC: 29 MMOL/L (ref 21–32)
CREAT SERPL-MCNC: 0.64 MG/DL (ref 0.6–1.3)
DIFFERENTIAL METHOD BLD: ABNORMAL
EOSINOPHIL # BLD: 0.2 K/UL (ref 0–0.4)
EOSINOPHIL NFR BLD: 4 % (ref 0–5)
ERYTHROCYTE [DISTWIDTH] IN BLOOD BY AUTOMATED COUNT: 15 % (ref 11.6–14.5)
GLUCOSE BLD STRIP.AUTO-MCNC: 105 MG/DL (ref 70–110)
GLUCOSE SERPL-MCNC: 87 MG/DL (ref 74–99)
HCT VFR BLD AUTO: 34.6 % (ref 35–45)
HGB BLD-MCNC: 11.2 G/DL (ref 12–16)
LYMPHOCYTES # BLD: 1.4 K/UL (ref 0.9–3.6)
LYMPHOCYTES NFR BLD: 31 % (ref 21–52)
MCH RBC QN AUTO: 25.3 PG (ref 24–34)
MCHC RBC AUTO-ENTMCNC: 32.4 G/DL (ref 31–37)
MCV RBC AUTO: 78.1 FL (ref 74–97)
MONOCYTES # BLD: 0.7 K/UL (ref 0.05–1.2)
MONOCYTES NFR BLD: 14 % (ref 3–10)
NEUTS SEG # BLD: 2.4 K/UL (ref 1.8–8)
NEUTS SEG NFR BLD: 51 % (ref 40–73)
PLATELET # BLD AUTO: 163 K/UL (ref 135–420)
PMV BLD AUTO: 11.6 FL (ref 9.2–11.8)
POTASSIUM SERPL-SCNC: 3.6 MMOL/L (ref 3.5–5.5)
RBC # BLD AUTO: 4.43 M/UL (ref 4.2–5.3)
SODIUM SERPL-SCNC: 144 MMOL/L (ref 136–145)
WBC # BLD AUTO: 4.6 K/UL (ref 4.6–13.2)

## 2018-07-15 PROCEDURE — 36415 COLL VENOUS BLD VENIPUNCTURE: CPT | Performed by: PHYSICIAN ASSISTANT

## 2018-07-15 PROCEDURE — 65660000000 HC RM CCU STEPDOWN

## 2018-07-15 PROCEDURE — 85025 COMPLETE CBC W/AUTO DIFF WBC: CPT | Performed by: PHYSICIAN ASSISTANT

## 2018-07-15 PROCEDURE — 74011250636 HC RX REV CODE- 250/636: Performed by: INTERNAL MEDICINE

## 2018-07-15 PROCEDURE — 74011250637 HC RX REV CODE- 250/637: Performed by: INTERNAL MEDICINE

## 2018-07-15 PROCEDURE — 74011250637 HC RX REV CODE- 250/637: Performed by: HOSPITALIST

## 2018-07-15 PROCEDURE — 82962 GLUCOSE BLOOD TEST: CPT

## 2018-07-15 PROCEDURE — 80048 BASIC METABOLIC PNL TOTAL CA: CPT | Performed by: PHYSICIAN ASSISTANT

## 2018-07-15 RX ORDER — LISINOPRIL 10 MG/1
10 TABLET ORAL DAILY
Status: COMPLETED | OUTPATIENT
Start: 2018-07-15 | End: 2018-07-15

## 2018-07-15 RX ORDER — HYDRALAZINE HYDROCHLORIDE 20 MG/ML
10 INJECTION INTRAMUSCULAR; INTRAVENOUS
Status: DISCONTINUED | OUTPATIENT
Start: 2018-07-15 | End: 2018-07-16 | Stop reason: HOSPADM

## 2018-07-15 RX ORDER — LISINOPRIL 10 MG/1
10 TABLET ORAL DAILY
Status: DISCONTINUED | OUTPATIENT
Start: 2018-07-15 | End: 2018-07-16 | Stop reason: HOSPADM

## 2018-07-15 RX ORDER — CLONIDINE 0.1 MG/24H
1 PATCH, EXTENDED RELEASE TRANSDERMAL
Status: DISCONTINUED | OUTPATIENT
Start: 2018-07-15 | End: 2018-07-16 | Stop reason: HOSPADM

## 2018-07-15 RX ORDER — AMLODIPINE BESYLATE 5 MG/1
5 TABLET ORAL EVERY EVENING
Status: DISCONTINUED | OUTPATIENT
Start: 2018-07-15 | End: 2018-07-15

## 2018-07-15 RX ADMIN — HYDRALAZINE HYDROCHLORIDE 25 MG: 25 TABLET, FILM COATED ORAL at 08:39

## 2018-07-15 RX ADMIN — LISINOPRIL 10 MG: 10 TABLET ORAL at 17:19

## 2018-07-15 RX ADMIN — Medication 10 ML: at 13:22

## 2018-07-15 RX ADMIN — Medication 10 ML: at 21:37

## 2018-07-15 RX ADMIN — LISINOPRIL 10 MG: 10 TABLET ORAL at 09:57

## 2018-07-15 RX ADMIN — HYDRALAZINE HYDROCHLORIDE 10 MG: 20 INJECTION INTRAMUSCULAR; INTRAVENOUS at 21:33

## 2018-07-15 RX ADMIN — ATORVASTATIN CALCIUM 40 MG: 40 TABLET, FILM COATED ORAL at 21:37

## 2018-07-15 NOTE — PROGRESS NOTES
[facility] Hospitalist Group  Progress Note    Patient: Christiano Cloud Age: 70 y.o. : 1940 MR#: [id number] SSN: [id number]  Date: 7/15/2018     Subjective:     Pt has no complaints. When questioned on taking for BP meds, pt states \"I don't like them\". Pt is informed of importance of BP control and risks for another stroke. Pt still declines medications. Assessment/Plan:   1. Transient Ischemic Attack: Neurology consulted, cont ASA, plavix, and statin if pt agrees to take them. 2. H/o CVA with Multifocal remote microhemorrhages bilateral cerebral hemispheres, cerebellum and vermis: cont ASA, plavix, and statin. Neurology recommends to continue dual plt tx with known hx of hemorrhage for 3 months. 3. Uncontrolled HTN: BP higher side, BP meds adjusted, cont Lisinopril and clonidine patch added. 4. Hyperlipidemia: cont statin. 5. Dementia: supportive care, pt refusing UA and cx. Monitor. 6. Full code        Goals of care:  Full code  Disposition:  [x]PT/OT ordered   [x] Case management referral   Pt came from [facility] D/[facility] , they do not accept pt on weekends.      Case discussed with:  [x]Patient  [x]Family  [x]Nursing  [x]Case Management  DVT Prophylaxis:  []Lovenox  [x]Hep SQ  []SCDs  []Coumadin   []On Heparin gtt       Objective:   VS:   Visit Vitals    BP (!) 189/104 (BP Patient Position: Sitting)    Pulse 100    Temp 97.5 °F (36.4 °C)    Resp 18    Ht 5' 2\" (1.575 m)    Wt 49.6 kg (109 lb 4.8 oz)    SpO2 100%    Breastfeeding No    BMI 19.99 kg/m2      Tmax/24hrs: Temp (24hrs), Av.4 °F (36.3 °C), Min:97.1 °F (36.2 °C), Max:97.7 °F (36.5 °C)    Intake/Output Summary (Last 24 hours) at 07/15/18 1701  Last data filed at 18 2210   Gross per 24 hour   Intake              240 ml   Output                0 ml   Net              240 ml       General:  Awake, NAD, appears confused  Cardiovascular:  RRR  Pulmonary: CTA   GI:  NT, normal BS  Extremities: No edema or cyanosis  Neuro: AAOx2. Right residual hemiplegia from previous stroke, no other focal neurological deficits. No facial droop. Labs:    Recent Results (from the past 24 hour(s))   CBC WITH AUTOMATED DIFF    Collection Time: 07/15/18  5:51 AM   Result Value Ref Range    WBC 4.6 4.6 - 13.2 K/uL    RBC 4.43 4.20 - 5.30 M/uL    HGB 11.2 (L) 12.0 - 16.0 g/dL    HCT 34.6 (L) 35.0 - 45.0 %    MCV 78.1 74.0 - 97.0 FL    MCH 25.3 24.0 - 34.0 PG    MCHC 32.4 31.0 - 37.0 g/dL    RDW 15.0 (H) 11.6 - 14.5 %    PLATELET 351 883 - 484 K/uL    MPV 11.6 9.2 - 11.8 FL    NEUTROPHILS 51 40 - 73 %    LYMPHOCYTES 31 21 - 52 %    MONOCYTES 14 (H) 3 - 10 %    EOSINOPHILS 4 0 - 5 %    BASOPHILS 0 0 - 2 %    ABS. NEUTROPHILS 2.4 1.8 - 8.0 K/UL    ABS. LYMPHOCYTES 1.4 0.9 - 3.6 K/UL    ABS. MONOCYTES 0.7 0.05 - 1.2 K/UL    ABS. EOSINOPHILS 0.2 0.0 - 0.4 K/UL    ABS.  BASOPHILS 0.0 0.0 - 0.1 K/UL    DF AUTOMATED     METABOLIC PANEL, BASIC    Collection Time: 07/15/18  5:51 AM   Result Value Ref Range    Sodium 144 136 - 145 mmol/L    Potassium 3.6 3.5 - 5.5 mmol/L    Chloride 108 100 - 108 mmol/L    CO2 29 21 - 32 mmol/L    Anion gap 7 3.0 - 18 mmol/L    Glucose 87 74 - 99 mg/dL    BUN 11 7.0 - 18 MG/DL    Creatinine 0.64 0.6 - 1.3 MG/DL    BUN/Creatinine ratio 17 12 - 20      GFR est AA >60 >60 ml/min/1.73m2    GFR est non-AA >60 >60 ml/min/1.73m2    Calcium 9.1 8.5 - 10.1 MG/DL       Signed By: Jennifer Lerma PA-C     July 15, 2018 5:01 PM

## 2018-07-15 NOTE — PROGRESS NOTES
Bedside shift change report given to Shabbir Adhikari RN (oncoming nurse) by Azul Jimenez RN (offgoing nurse). Report included the following information SBAR, Recent Results and Med Rec Status.

## 2018-07-15 NOTE — PROGRESS NOTES
Problem: Falls - Risk of  Goal: *Absence of Falls  Document Gustavo Fall Risk and appropriate interventions in the flowsheet.    Outcome: Progressing Towards Goal  Fall Risk Interventions:  Mobility Interventions: Assess mobility with egress test, Communicate number of staff needed for ambulation/transfer, Mechanical lift, Patient to call before getting OOB    Mentation Interventions: Adequate sleep, hydration, pain control, Door open when patient unattended, Evaluate medications/consider consulting pharmacy, Eyeglasses and hearing aids    Medication Interventions: Assess postural VS orthostatic hypotension, Evaluate medications/consider consulting pharmacy, Patient to call before getting OOB         History of Falls Interventions: Door open when patient unattended, Evaluate medications/consider consulting pharmacy

## 2018-07-15 NOTE — PROGRESS NOTES
Re:  Karen Elmer up visit     7/15/2018 1:47 PM 
 
SSN: [id number] Subjective:   Flossie Wilhelmena Lundborg is seen in follow up. She's sitting up in a chair watching TV. NP note seen and agree she needs to be compliant with her BP treatment. Medications:   
Current Facility-Administered Medications Medication Dose Route Frequency Provider Last Rate Last Dose  lisinopril (PRINIVIL, ZESTRIL) tablet 10 mg  10 mg Oral DAILY Verona Robles MD   10 mg at 07/15/18 5999  cloNIDine (CATAPRES) 0.1 mg/24 hr patch 1 Patch  1 Patch TransDERmal Q7D Verona Robles MD   1 Patch at 07/15/18 1321  
 atorvastatin (LIPITOR) tablet 40 mg  40 mg Oral QHS Cherri Canela MD      
 sodium chloride (NS) flush 5-10 mL  5-10 mL IntraVENous Q8H Cherri Canela MD   10 mL at 07/15/18 1322  sodium chloride (NS) flush 5-10 mL  5-10 mL IntraVENous PRN Cherri Canela MD      
 clopidogrel (PLAVIX) tablet 75 mg  75 mg Oral DAILY Cherri Canela MD   75 mg at 07/14/18 0840  aspirin chewable tablet 81 mg  81 mg Oral DAILY Cherri Canela MD   81 mg at 07/14/18 0840  acetaminophen (TYLENOL) tablet 650 mg  650 mg Oral Q4H PRN Cherri Canela MD      
 heparin (porcine) injection 5,000 Units  5,000 Units SubCUTAneous Q8H Cherri Canela MD      
 
 
Vital signs:   
Visit Vitals  /80 (BP 1 Location: Left arm, BP Patient Position: Sitting)  Pulse 94  Temp 97.5 °F (36.4 °C)  Resp 18  Ht 5' 2\" (1.575 m)  Wt 49.6 kg (109 lb 4.8 oz)  SpO2 100%  Breastfeeding No  
 BMI 19.99 kg/m2 Review of Systems: As above otherwise 11 point review of systems negative including;  
Constitutional no fever or chills Skin denies rash or itching HEENT  Denies tinnitus, hearing lose Eyes denies diplopia vision lose Respiratory denies sortness of breath Cardiovascular denies chest pain, dyspnea on exertion Gastrointestinal denies nausea, vomiting, diarrhea, constipation Genitourinary denies incontinence Musculoskeletal denies joint pain or swelling Endocrine denies weight change Hematology denies easy bruising or bleeding Neurological as above in HPI Patient Active Problem List  
Diagnosis Code  CVA (cerebral vascular accident) ([facility] [address] I63.9  Diastolic heart failure secondary to hypertension (HCC) I11.0, I50.30  Stage 2 chronic kidney disease due to benign hypertension I12.9, N18.2  Cholelithiasis K80.20  
 Hiatal hernia K44.9  Diverticulosis of colon K57.30  
 H/O sinus bradycardia Z86.79  
 Hyperlipidemia E78.5  Hypertension I10  
 CVA (cerebrovascular accident due to intracerebral hemorrhage) ([facility] [place] [address] I61.9  Cerebrovascular accident (CVA) ([facility] [address] I63.9  TIA (transient ischemic attack) G45.9 Objective: The patient is awake, alert, and oriented x 4. Fund of knowledge is adequate. Speech is fluent and memory is intact. Cranial Nerves: II  Visual fields are full to confrontation. III, IV, VI  Extraocular movements are intact. There is no nystagmus. V  Facial sensation is intact to pinprick. VII  Face is asymmetrical, she has a dense old right facial.  VIII - Hearing is present. IX, X, XII  Palate is symmetrical.   XI - Shoulder shrugging and head turning intact Motor: The patient has a significant right hemiparesis, arm greater than leg. Tone is increased on the right. Reflexes are increased on the right. Gait is right hemiparetic. CBC:  
Lab Results Component Value Date/Time WBC 4.6 07/15/2018 05:51 AM  
 RBC 4.43 07/15/2018 05:51 AM  
 HGB 11.2 (L) 07/15/2018 05:51 AM  
 HCT 34.6 (L) 07/15/2018 05:51 AM  
 PLATELET 195 65/92/4137 05:51 AM  
 
BMP:  
Lab Results Component Value Date/Time  Glucose 87 07/15/2018 05:51 AM  
 Sodium 144 07/15/2018 05:51 AM  
 Potassium 3.6 07/15/2018 05:51 AM  
 Chloride 108 07/15/2018 05:51 AM  
 CO2 29 07/15/2018 05:51 AM  
 BUN 11 07/15/2018 05:51 AM  
 Creatinine 0.64 07/15/2018 05:51 AM  
 Calcium 9.1 07/15/2018 05:51 AM  
 
CMP:  
Lab Results Component Value Date/Time Glucose 87 07/15/2018 05:51 AM  
 Sodium 144 07/15/2018 05:51 AM  
 Potassium 3.6 07/15/2018 05:51 AM  
 Chloride 108 07/15/2018 05:51 AM  
 CO2 29 07/15/2018 05:51 AM  
 BUN 11 07/15/2018 05:51 AM  
 Creatinine 0.64 07/15/2018 05:51 AM  
 Calcium 9.1 07/15/2018 05:51 AM  
 Anion gap 7 07/15/2018 05:51 AM  
 BUN/Creatinine ratio 17 07/15/2018 05:51 AM  
 Alk. phosphatase 64 12/22/2016 10:36 PM  
 Protein, total 7.7 12/22/2016 10:36 PM  
 Albumin 3.4 12/22/2016 10:36 PM  
 Globulin 4.3 (H) 12/22/2016 10:36 PM  
 A-G Ratio 0.8 12/22/2016 10:36 PM  
 
Coagulation:  
Lab Results Component Value Date/Time Prothrombin time 12.4 07/13/2018 01:20 PM  
 INR 1.0 07/13/2018 01:20 PM  
 aPTT 70.8 (H) 02/11/2016 08:35 AM  
 
 
Assessment:  TIA in this patient who has risk factors including poorly controlled BP, medication non-adherence, old ,left stroke. Plan:  Dual anti-platelet therapy for 3 months then aspirin alone. BP control!!! Will see as needed only. Sincerely, 
 
 
 
Angela Mchugh.  Linda Cortez M.D.

## 2018-07-15 NOTE — PROGRESS NOTES
Assume pt's care, pt refuse to stay in the room, at risk for elopement. Pt refuse VS and POC blood glucose  Dr. Edward Crouch informed, new order received. 2110: Haldol 1mg administered, crackers and ginger-alejandrina administered per pt request.  2330: Pt in wheelchair, ambulate with RN. Pt later taking to her room and assisted to bed, pt asleep at this moment  0435: Unable to obtain urine specimen d/t particles in the urine, will reattempts. Pt return back to bed, resting quietly. She refuse VS  0450: Telephone call received from pt's son, pertinent questions answered regarding BP meds and VS. VS reattempts at this moment, see flowsheet.     0708: Bedside shift change report given to Cris Crump RN (oncoming nurse) by Colleen Pinedo RN (offgoing nurse). Report included the following information SBAR, Intake/Output, MAR and Recent Results.

## 2018-07-16 VITALS
HEIGHT: 62 IN | DIASTOLIC BLOOD PRESSURE: 65 MMHG | BODY MASS INDEX: 20.11 KG/M2 | OXYGEN SATURATION: 98 % | SYSTOLIC BLOOD PRESSURE: 135 MMHG | TEMPERATURE: 97.8 F | HEART RATE: 103 BPM | RESPIRATION RATE: 20 BRPM | WEIGHT: 109.3 LBS

## 2018-07-16 PROCEDURE — 74011250637 HC RX REV CODE- 250/637: Performed by: HOSPITALIST

## 2018-07-16 PROCEDURE — 74011250637 HC RX REV CODE- 250/637: Performed by: INTERNAL MEDICINE

## 2018-07-16 PROCEDURE — 74011250636 HC RX REV CODE- 250/636: Performed by: INTERNAL MEDICINE

## 2018-07-16 RX ORDER — LISINOPRIL 10 MG/1
10 TABLET ORAL DAILY
Qty: 30 TAB | Refills: 0 | Status: SHIPPED | OUTPATIENT
Start: 2018-07-17

## 2018-07-16 RX ORDER — CLOPIDOGREL BISULFATE 75 MG/1
75 TABLET ORAL DAILY
Qty: 30 TAB | Refills: 0 | Status: SHIPPED | OUTPATIENT
Start: 2018-07-17

## 2018-07-16 RX ORDER — CLONIDINE 0.1 MG/24H
1 PATCH, EXTENDED RELEASE TRANSDERMAL
Qty: 4 PATCH | Refills: 0 | Status: SHIPPED | OUTPATIENT
Start: 2018-07-22

## 2018-07-16 RX ORDER — GUAIFENESIN 100 MG/5ML
81 LIQUID (ML) ORAL DAILY
Qty: 30 TAB | Refills: 0 | Status: SHIPPED | OUTPATIENT
Start: 2018-07-17

## 2018-07-16 RX ORDER — ATORVASTATIN CALCIUM 40 MG/1
40 TABLET, FILM COATED ORAL
Qty: 30 TAB | Refills: 0 | Status: SHIPPED | OUTPATIENT
Start: 2018-07-16

## 2018-07-16 RX ADMIN — ASPIRIN 81 MG 81 MG: 81 TABLET ORAL at 08:17

## 2018-07-16 NOTE — DISCHARGE INSTRUCTIONS
Make sure to take all your medications including your BP medication. Take ASA and Plavix for 3 months and follow up with your neurologist.      Transient Ischemic Attack: Care Instructions  Your Care Instructions    A transient ischemic attack (TIA) is when blood flow to a part of your brain is blocked for a short time. A TIA is like a stroke but usually lasts only a few minutes. A TIA does not cause lasting brain damage. Any vision problems, slurred speech, or other symptoms usually go away in 10 to 20 minutes. But they may last for up to 24 hours. TIAs are often warning signs of a stroke. Some people who have a TIA may have a stroke in the future. A stroke can cause symptoms like those of a TIA. But a stroke causes lasting damage to your brain. You can take steps to help prevent a stroke. One thing you can do is get early treatment. If you have other new symptoms, or if your symptoms do not get better, go back to the emergency room or call your doctor right away. Getting treatment right away may prevent long-term brain damage caused by a stroke. The doctor has checked you carefully, but problems can develop later. If you notice any problems or new symptoms, get medical treatment right away. Follow-up care is a key part of your treatment and safety. Be sure to make and go to all appointments, and call your doctor if you are having problems. It's also a good idea to know your test results and keep a list of the medicines you take. How can you care for yourself at home? Medicines    · Be safe with medicines. Take your medicines exactly as prescribed. Call your doctor if you think you are having a problem with your medicine.     · If you take a blood thinner, such as aspirin, be sure you get instructions about how to take your medicine safely.  Blood thinners can cause serious bleeding problems.     · Call your doctor if you are not able to take your medicines for any reason.     · Do not take any over-the-counter medicines or herbal products without talking to your doctor first.     · If you take birth control pills or hormone therapy, talk to your doctor. Ask if these treatments are right for you.    Lifestyle changes    · Do not smoke. If you need help quitting, talk to your doctor about stop-smoking programs and medicines.     · Be active. If your doctor recommends it, get more exercise. Walking is a good choice. Bit by bit, increase the amount you walk every day. Try for at least 30 minutes on most days of the week. You also may want to swim, bike, or do other activities.     · Eat heart-healthy foods. These include fruits, vegetables, high-fiber foods, fish, and foods that are low in sodium, saturated fat, and trans fat.     · Stay at a healthy weight. Lose weight if you need to.     · Limit alcohol to 2 drinks a day for men and 1 drink a day for women.    Staying healthy    · Manage other health problems such as diabetes, high blood pressure, and high cholesterol.     · Get the flu vaccine every year. When should you call for help? Call 911 anytime you think you may need emergency care. For example, call if:    · You have new or worse symptoms of a stroke. These may include:  ¨ Sudden numbness, tingling, weakness, or loss of movement in your face, arm, or leg, especially on only one side of your body. ¨ Sudden vision changes. ¨ Sudden trouble speaking. ¨ Sudden confusion or trouble understanding simple statements. ¨ Sudden problems with walking or balance. ¨ A sudden, severe headache that is different from past headaches. Call 911 even if these symptoms go away in a few minutes.     · You feel like you are having another TIA.    Watch closely for changes in your health, and be sure to contact your doctor if you have any problems. Where can you learn more? Go to http://tomasz-joycelyn.info/.   Enter [phone] in the search box to learn more about \"Transient Ischemic Attack: Care Instructions. \"  Current as of: November 21, 2017  Content Version: 11.7  © 3827-1677 [employer], [facility]. Care instructions adapted under license by [employer] (which disclaims liability or warranty for this information). If you have questions about a medical condition or this instruction, always ask your healthcare professional. [place] [address] any warranty or liability for your use of this information.

## 2018-07-16 NOTE — PROGRESS NOTES
0830- Educated patient on the importance of taking medication. Refused all meds this morning. Pt states \"I don't need to take any medicine\". VSS. Will continue to monitor and assess.      Q4H [facility] assessment discontinued per Dr. Tin Richard

## 2018-07-16 NOTE — PROGRESS NOTES
met with patient, completed the initial Spiritual Assessment of the patient, and offered Pastoral Care, see flow sheets for interventions. Limited Pastoral support provided with Spiritual Health Kit. Patient does not have any Rastafari/cultural needs that will affect patients preferences in health care. Chart reviewed. Chaplains will continue to follow and will provide pastoral care on an as needed/as requested basis. Jamison Grande MDiv.   Board Certified [employer] [phone]

## 2018-07-16 NOTE — PROGRESS NOTES
This pt will be returning to [place] [address]. living, spoke with this assisted living and this pt is approved to return today. Pt is requesting that her family transport her back to facility. Pt's medications faxed to [facility] to be filled. Pt is requesting that her family transport her back to the facility. [facility] [address]  who requested that all prescriptions have the diagnosis place on them and the facility uses [facility] [employer]. Pt's son Ashanti Mcgee was spoken to and requested that this pt be medically transported to this facility. Transportation set-up for 11:30 a.m.  transport to [facility] [address]. Home Health request placed in e-discharge for [facility].

## 2018-07-16 NOTE — PROGRESS NOTES
Report called and given to Virginia Cameron LPN. Verbalized understanding of discharge instructions. Opportunity for questions and clarification was given. VSS. Transport scheduled to come at 11am.     56- Son called per request. Educated patient and family member on the importance of taking medications especially blood pressure medicine. No evidence of learning. Patient and son do not understand the need for her to take medications through repeated education. Handout for complications of high blood pressure and importance of taking medications retaught. No evidence of learning.

## 2018-07-16 NOTE — DISCHARGE SUMMARY
[facility]  Discharge Summary       Patient: Luis Civil Age: 70 y.o. : 1940 MR#: [id number] SSN: [id number]  PCP on record: Bhaskar Noble NP  Admit date: 2018  Discharge date: 2018    Disposition:    []Home   [x]Assisted Living Facility   []SNF/NH   []Rehab   []Home with family   []Alternate Facility:____________________    Admission Diagnosis: TIA    Discharge Diagnoses:                             1. Transient Ischemic Attack  2. H/o CVA with Multifocal remote microhemorrhages bilateral cerebral hemispheres, cerebellum and vermis  3. Uncontrolled HTN  4. Hyperlipidemia  5. Dementia    Discharge Medications:     Current Discharge Medication List      START taking these medications    Details   aspirin 81 mg chewable tablet Take 1 Tab by mouth daily. Indications: prevention of cerebrovascular accident  Qty: 30 Tab, Refills: 0      cloNIDine (CATAPRES) 0.1 mg/24 hr patch 1 Patch by TransDERmal route every seven (7) days. Indications: hypertension  Qty: 4 Patch, Refills: 0      clopidogrel (PLAVIX) 75 mg tab Take 1 Tab by mouth daily. Indications: Cerebral Thromboembolism Prevention  Qty: 30 Tab, Refills: 0         CONTINUE these medications which have CHANGED    Details   atorvastatin (LIPITOR) 40 mg tablet Take 1 Tab by mouth nightly. Indications: prevention of cerebrovascular accident  Qty: 30 Tab, Refills: 0      lisinopril (PRINIVIL, ZESTRIL) 10 mg tablet Take 1 Tab by mouth daily.  Indications: hypertension  Qty: 30 Tab, Refills: 0         STOP taking these medications       hydrALAZINE (APRESOLINE) 25 mg tablet Comments:   Reason for Stopping:         calcium carbonate (CALTREX) 600 mg (1,500 mg) tablet Comments:   Reason for Stopping:         amLODIPine (NORVASC) 10 mg tablet Comments:   Reason for Stopping:               Consults: Neurology  -   Procedures: none  -     Significant Diagnostic Studies:   CT head w/o contrast  IMPRESSION:    No acute findings or significant interval change. MRA NECK ARTERIES WITHOUT AND WITH CONTRAST  IMPRESSION:     High-grade stenosis intradural segment of the nondominant right vertebral  artery, widely patent dominant left vertebral artery with mild irregularity at  the V4 segment.     Patent carotid bifurcations.     Origin of the left common carotid artery is not well visualized, and could be  Stenotic. MR BRAIN WITHOUT CONTRAST  IMPRESSION:     No evidence of an acute intracranial process.     Mild to moderate generalized volume loss and advanced burden chronic  microvascular disease.     Large remote infarcts left basal ganglia to corona radiata with associated  Wallerian degeneration of ipsilateral cortical spinal tracts in the brainstem  and remote lacunar infarcts bilateral basal ganglia and thalami. This is similar  to the previous exam.     Preliminary report was provided by Dr. Ilene Andrew at 2109 hours on 7/13/2018.     Evolution of large infarct anterior left greater than right basal ganglia since  the previous examination, with ex vacuo dilatation of the frontal horn of the  left lateral ventricle.     Multifocal remote microhemorrhages bilateral cerebral hemispheres, cerebellum  and vermis. Hospital Course by Problem   1. Transient Ischemic Attack: admitted due to being found \"unresponsive\" during lunch time at her facility. EMS was called and she was reported as being aphasic. CT head w/ no acute pathology. MRI brain w/ no evidence of an acute intracranial process. MRA head/neck as above. Neurology was consulted and followed pt. Advised to have better control of BP as pt refuses medications. Also advised to take Aspirin and Plavix for 3 months and then neurology will decide one of which medication to continue. Stressed importance of taking all medications as prescribed.  Pt did not take all medications while in hospital.   2. H/o CVA with Multifocal remote microhemorrhages bilateral cerebral hemispheres, cerebellum and vermis: managed with Plavix and Aspirin while inpt. 3. Uncontrolled HTN: medications adjusted per blood pressure. Clonidine 0.1mg added as pt refused some medications by mouth. Pt did take her Lisinopril 10mg for 2 days. So both medications will be continued at discharge. Attending discussed this with Son Bisi MEZA who agrees with plan. May increase Clonidine to 0.2mg at [facility] if needed- PCP to decide. 4. Hyperlipidemia: managed with statin. 5. Dementia: UA and Urine cx ordered but pt refused. Stable before discharge. Stable condition for discharge  D/w PCP - John Flores NP about pt care while hospital.    Today's examination of the patient revealed:     Subjective:   Pt states she feels fine. No chest pain, SOB, N/V/D/C. Pt is encouraged to take medications as prescribed as she refuses some of her medications. Objective:   VS:   Visit Vitals    /70 (BP 1 Location: Left arm, BP Patient Position: Sitting)    Pulse 78    Temp 97.7 °F (36.5 °C)    Resp 19    Ht 5' 2\" (1.575 m)    Wt 49.6 kg (109 lb 4.8 oz)    SpO2 100%    Breastfeeding No    BMI 19.99 kg/m2      Tmax/24hrs: Temp (24hrs), Av.6 °F (36.4 °C), Min:97.4 °F (36.3 °C), Max:97.9 °F (36.6 °C)     Input/Output:   Intake/Output Summary (Last 24 hours) at 18 0920  Last data filed at 07/15/18 2229   Gross per 24 hour   Intake              240 ml   Output                0 ml   Net              240 ml       General:  Awake, NAD, appears confused  Cardiovascular:  RRR  Pulmonary: CTA   GI:  NT, normal BS  Extremities:  No edema or cyanosis  Neuro: AAOx2. Right residual hemiplegia from previous stroke, no other focal neurological deficits. No facial droop. Labs:    Recent Results (from the past 24 hour(s))   GLUCOSE, POC    Collection Time: 07/15/18  9:53 PM   Result Value Ref Range    Glucose (POC) 105 70 - 110 mg/dL     Additional Data Reviewed:     Condition:   Follow-up Appointments:   1.  Your PCP: Brandon Ramirez NP, within 7-10days  2.  Your Neurologist: Karena Medina MD within 2 weeks    >30 minutes spent coordinating this discharge (review instructions/follow-up, prescriptions, preparing report for sign off)    Electronically Signed By:  Chelly Jonas PA-C  7/16/2018  9:20 AM

## 2018-07-16 NOTE — PROGRESS NOTES
Problem: Falls - Risk of  Goal: *Absence of Falls  Document Gustavo Fall Risk and appropriate interventions in the flowsheet.    Outcome: Progressing Towards Goal  Fall Risk Interventions:  Mobility Interventions: Patient to call before getting OOB    Mentation Interventions: Adequate sleep, hydration, pain control, Door open when patient unattended, More frequent rounding, Reorient patient    Medication Interventions: Patient to call before getting OOB, Teach patient to arise slowly         History of Falls Interventions: Door open when patient unattended

## 2018-07-16 NOTE — PROGRESS NOTES
Received pt while sitting in a chair, she is alert and oriented to self, in no apparent discomfort. Clonidine patch on left arm, will continue to monitor. /94, will notify MD  Order received for hydralazine 10mg PRN q6hr  Bedside shift change report given to ANGEL Gordon (oncoming nurse) by Ana Rosa Rivero RN (offgoing nurse). Report included the following information SBAR, Intake/Output, MAR and Recent Results.

## 2018-07-16 NOTE — PROGRESS NOTES
CMS went to speak with the pt in regards to the [employer] from Medicare About Your Rights. \"  Pt was able to review and sign the documents provided. No family were present at bedside. Signed documents can be found in the chart for review; additional copies were left with the pt for review.

## 2018-07-16 NOTE — INTERDISCIPLINARY ROUNDS
Interdisciplinary STROKE Rounds       Recommendations:     Recommendations are as follows:   1. Stop q4 NIHSS. Neuro has signed off case. 2. Pt expected to go to assisted living today. 3. Pt is non-compliant with medications. Nurse to re-educate on the need to take medications daily for BP and stroke prevention. Stroke Meds currently prescribed: ASA 81 mg, Plavix, Lipitor 40 mg  DVT prophylaxis: heparin      Discharge Plan: Assisted Living        Discipline Participation:   Interdisciplinary rounds were conducted by:  Dr. Tatianna Baez, Neuroscience Medical director,   Fela Hernandez RN, stroke coordinator  Froylan Quiñones, SLP,  ZACHERY McginnisU liaison,  Heidi ORTIZ, the patient's nurse. Discussion included patient's current condition, any tests and patient's expected discharge outcome.

## 2018-07-18 LAB
ATRIAL RATE: 70 BPM
CALCULATED P AXIS, ECG09: 68 DEGREES
CALCULATED R AXIS, ECG10: 19 DEGREES
CALCULATED T AXIS, ECG11: 76 DEGREES
DIAGNOSIS, 93000: NORMAL
P-R INTERVAL, ECG05: 152 MS
Q-T INTERVAL, ECG07: 416 MS
QRS DURATION, ECG06: 82 MS
QTC CALCULATION (BEZET), ECG08: 449 MS
VENTRICULAR RATE, ECG03: 70 BPM